# Patient Record
Sex: MALE | Race: WHITE | NOT HISPANIC OR LATINO | Employment: OTHER | URBAN - METROPOLITAN AREA
[De-identification: names, ages, dates, MRNs, and addresses within clinical notes are randomized per-mention and may not be internally consistent; named-entity substitution may affect disease eponyms.]

---

## 2017-08-08 ENCOUNTER — OFFICE VISIT (OUTPATIENT)
Dept: GASTROENTEROLOGY | Facility: CLINIC | Age: 67
End: 2017-08-08
Payer: COMMERCIAL

## 2017-08-08 VITALS
SYSTOLIC BLOOD PRESSURE: 134 MMHG | BODY MASS INDEX: 33.51 KG/M2 | HEIGHT: 72 IN | WEIGHT: 247.38 LBS | DIASTOLIC BLOOD PRESSURE: 86 MMHG | HEART RATE: 65 BPM

## 2017-08-08 DIAGNOSIS — K21.9 GASTROESOPHAGEAL REFLUX DISEASE, ESOPHAGITIS PRESENCE NOT SPECIFIED: Primary | ICD-10-CM

## 2017-08-08 DIAGNOSIS — Z79.01 CHRONIC ANTICOAGULATION: ICD-10-CM

## 2017-08-08 DIAGNOSIS — Z12.11 COLON CANCER SCREENING: ICD-10-CM

## 2017-08-08 PROCEDURE — 1159F MED LIST DOCD IN RCRD: CPT | Mod: S$GLB,,, | Performed by: INTERNAL MEDICINE

## 2017-08-08 PROCEDURE — 99999 PR PBB SHADOW E&M-EST. PATIENT-LVL III: CPT | Mod: PBBFAC,,, | Performed by: INTERNAL MEDICINE

## 2017-08-08 PROCEDURE — 99204 OFFICE O/P NEW MOD 45 MIN: CPT | Mod: S$GLB,,, | Performed by: INTERNAL MEDICINE

## 2017-08-08 PROCEDURE — 1126F AMNT PAIN NOTED NONE PRSNT: CPT | Mod: S$GLB,,, | Performed by: INTERNAL MEDICINE

## 2017-08-08 PROCEDURE — 3008F BODY MASS INDEX DOCD: CPT | Mod: S$GLB,,, | Performed by: INTERNAL MEDICINE

## 2017-08-08 RX ORDER — RABEPRAZOLE SODIUM 20 MG/1
20 TABLET, DELAYED RELEASE ORAL DAILY
COMMUNITY
End: 2019-06-12 | Stop reason: CLARIF

## 2017-08-08 RX ORDER — METOPROLOL SUCCINATE 100 MG/1
50 TABLET, EXTENDED RELEASE ORAL DAILY
COMMUNITY
End: 2019-06-12 | Stop reason: CLARIF

## 2017-08-08 RX ORDER — METFORMIN HYDROCHLORIDE 750 MG/1
750 TABLET, EXTENDED RELEASE ORAL
COMMUNITY

## 2017-08-08 RX ORDER — GLIMEPIRIDE 4 MG/1
4 TABLET ORAL
COMMUNITY

## 2017-08-08 RX ORDER — TADALAFIL 20 MG/1
20 TABLET ORAL DAILY
COMMUNITY
End: 2019-06-12 | Stop reason: CLARIF

## 2017-08-08 RX ORDER — SIMVASTATIN 20 MG/1
20 TABLET, FILM COATED ORAL NIGHTLY
COMMUNITY
End: 2019-06-12 | Stop reason: CLARIF

## 2017-08-08 NOTE — PROGRESS NOTES
Subjective:      Patient ID: Ramirez Aguilar is a 67 y.o. male.    Chief Complaint: Constipation; Heartburn; and Colonoscopy    HPI:   Patient is 67-year-old male presenting for GI evaluation.  Has chronic heartburn generally controlled on AcipHex.  Indicates he had a negative gastroscopy 10 years ago.  Also had a negative colonoscopy about 10 years ago.  Earlier this year was diagnosed with atrial fibrillation and placed on Eliquis.  Followed by an [facility] cardiologist.  Patient recognizes that he is due for follow-up colonoscopy screening.  Past medical history includes diabetes on oral agents.  Hypertension.  Nonsmoker.  Alcohol rare.  Family history negative for GI neoplasm.                                       Review of patient's allergies indicates:  No Known Allergies  History reviewed. No pertinent past medical history.  Past Surgical History:   Procedure Laterality Date    COLONOSCOPY  2007    UPPER GASTROINTESTINAL ENDOSCOPY  2007     Family History   Problem Relation Age of Onset    Heart disease Mother      Social History     Social History    Marital status:      Spouse name: N/A    Number of children: N/A    Years of education: N/A     Occupational History    Not on file.     Social History Main Topics    Smoking status: Never Smoker    Smokeless tobacco: Never Used    Alcohol use Yes    Drug use: Unknown    Sexual activity: Not on file     Other Topics Concern    Not on file     Social History Narrative    No narrative on file       Review of Systems:  Constitutional: Negative for appetite change.   HENT: Negative for trouble swallowing.   Eyes: Negative for photophobia.   Respiratory: Negative for cough and shortness of breath.   Cardiovascular: Negative for palpitations.   Gastrointestinal: See HPI for details.  Genitoupositive for frequency.  Nohematuria.   Skin: Negative for rash.   Neurological: Negative for weakness and hea  Musculoskeletal joint pains.matological:  Negative.   Psychiatric/Behavioral: Negative for suicidal ideas and behavioral problems.     Objective:     /86 (BP Location: Right arm, Patient Position: Sitting)   Pulse 65   Ht 6' (1.829 m)   Wt 112.2 kg (247 lb 6.4 oz)   BMI 33.55 kg/m²     Physical Exam:  Eyes: Pupils are equal, round, and reactive to light.   Neck: Supple. No mass  Cardiovascular: Regular rhythm . No murmur   Pulmonary/Chest: Lungs clear   Abdominal: Soft. No mass palpated. Nontender, no guarding. Positive bowel sounds   Musculoskeletal: No deformity. No edema.   Psychiatric: Alert and oriented    Assessment:     1. Gastroesophageal reflux disease, esophagitis presence not specified    2. Colon cancer screening      Plan:     Ramirez was seen today for constipation, heartburn and colonoscopy.    Diagnoses and all orders for this visit:    Gastroesophageal reflux disease, esophagitis presence not specified    Colon cancer screening    Chronic anticoagulation      Plan   Antireflux measures  Colonoscopy  Obtain OK from his cardiologist to hold Eliquis for 3 days prior    Primary physician Dr. Fritz Levy

## 2017-08-09 ENCOUNTER — TELEPHONE (OUTPATIENT)
Dept: GASTROENTEROLOGY | Facility: CLINIC | Age: 67
End: 2017-08-09

## 2017-08-09 NOTE — TELEPHONE ENCOUNTER
----- Message from Belinda Negron sent at 8/9/2017  8:13 AM CDT -----  Contact: Self [phone]  Patient is calling to talk to nurse has personal questions.

## 2017-08-09 NOTE — TELEPHONE ENCOUNTER
Spoke with patient in regards to his Cardiologist that handles his Eliquis, Dr. Rajesh Conklin at  [phone]. I informed the patient once we get the okay to hold his Eliquis the office will call him back to set a day for procedure.

## 2017-08-18 ENCOUNTER — TELEPHONE (OUTPATIENT)
Dept: GASTROENTEROLOGY | Facility: CLINIC | Age: 67
End: 2017-08-18

## 2017-08-18 NOTE — TELEPHONE ENCOUNTER
Spoke with patient in regards to his clearance from his doctor, I informed the patient that I have called and sent a fax to his doctor multiple times.

## 2017-08-18 NOTE — TELEPHONE ENCOUNTER
----- Message from Emir Mckeon sent at 8/18/2017  2:58 PM CDT -----  Contact: self/[phone]  Patient called to find out the status of the surgery clearance from his cardiologist and when the colonoscopy.    Please advise.

## 2017-08-21 ENCOUNTER — TELEPHONE (OUTPATIENT)
Dept: GASTROENTEROLOGY | Facility: CLINIC | Age: 67
End: 2017-08-21

## 2017-08-21 NOTE — TELEPHONE ENCOUNTER
Called the patient's Cardiologist office in regards to a fax  I have been sending to the office for hold on patient's Eliquis.

## 2017-09-01 ENCOUNTER — TELEPHONE (OUTPATIENT)
Dept: GASTROENTEROLOGY | Facility: CLINIC | Age: 67
End: 2017-09-01

## 2017-09-01 DIAGNOSIS — Z12.11 SCREENING FOR COLON CANCER: Primary | ICD-10-CM

## 2017-09-01 DIAGNOSIS — K21.9 GASTROESOPHAGEAL REFLUX DISEASE, ESOPHAGITIS PRESENCE NOT SPECIFIED: ICD-10-CM

## 2017-09-01 NOTE — TELEPHONE ENCOUNTER
Patient is scheduled for Colonoscopy in [place] on 09/18/2017. Patient was seen in [facility] on 08/08/2017, prep information was given and explained at the OV. Received the okay to hold the patient's Eliquis for 3 days prior to the procedure. Patient was notified of the hold on the medication. Scanned under media.

## 2017-09-11 ENCOUNTER — TELEPHONE (OUTPATIENT)
Dept: GASTROENTEROLOGY | Facility: CLINIC | Age: 67
End: 2017-09-11

## 2017-09-11 NOTE — TELEPHONE ENCOUNTER
----- Message from Edison Peralta MA sent at 9/11/2017  8:48 AM CDT -----  Contact: [phone]      ----- Message -----  From: Kathy Parkinson  Sent: 9/11/2017   8:25 AM  To: Tasha Cullen Staff ([place])    Patient called in requesting to speak with you. Patient prefers to speak with a nurse. Please advise.

## 2017-09-18 ENCOUNTER — ANESTHESIA EVENT (OUTPATIENT)
Dept: ENDOSCOPY | Facility: HOSPITAL | Age: 67
End: 2017-09-18
Payer: COMMERCIAL

## 2017-09-18 ENCOUNTER — SURGERY (OUTPATIENT)
Age: 67
End: 2017-09-18

## 2017-09-18 ENCOUNTER — ANESTHESIA (OUTPATIENT)
Dept: ENDOSCOPY | Facility: HOSPITAL | Age: 67
End: 2017-09-18
Payer: COMMERCIAL

## 2017-09-18 ENCOUNTER — HOSPITAL ENCOUNTER (OUTPATIENT)
Facility: HOSPITAL | Age: 67
Discharge: HOME | End: 2017-09-18
Attending: INTERNAL MEDICINE | Admitting: INTERNAL MEDICINE
Payer: COMMERCIAL

## 2017-09-18 DIAGNOSIS — Z12.11 COLON CANCER SCREENING: Primary | ICD-10-CM

## 2017-09-18 DIAGNOSIS — Z12.12 SCREENING FOR COLORECTAL CANCER: ICD-10-CM

## 2017-09-18 DIAGNOSIS — Z12.11 SCREENING FOR COLORECTAL CANCER: ICD-10-CM

## 2017-09-18 LAB — POCT GLUCOSE: 148 MG/DL (ref 70–110)

## 2017-09-18 PROCEDURE — G0121 COLON CA SCRN NOT HI RSK IND: HCPCS | Mod: ,,, | Performed by: INTERNAL MEDICINE

## 2017-09-18 PROCEDURE — 25000003 PHARM REV CODE 250: Performed by: ANESTHESIOLOGY

## 2017-09-18 PROCEDURE — 25000003 PHARM REV CODE 250: Performed by: INTERNAL MEDICINE

## 2017-09-18 PROCEDURE — G0121 COLON CA SCRN NOT HI RSK IND: HCPCS | Performed by: INTERNAL MEDICINE

## 2017-09-18 PROCEDURE — 37000008 HC ANESTHESIA 1ST 15 MINUTES: Performed by: INTERNAL MEDICINE

## 2017-09-18 PROCEDURE — 37000009 HC ANESTHESIA EA ADD 15 MINS: Performed by: INTERNAL MEDICINE

## 2017-09-18 PROCEDURE — 63600175 PHARM REV CODE 636 W HCPCS: Performed by: NURSE ANESTHETIST, CERTIFIED REGISTERED

## 2017-09-18 RX ORDER — PROPOFOL 10 MG/ML
VIAL (ML) INTRAVENOUS CONTINUOUS PRN
Status: DISCONTINUED | OUTPATIENT
Start: 2017-09-18 | End: 2017-09-18

## 2017-09-18 RX ORDER — LIDOCAINE HCL/PF 100 MG/5ML
SYRINGE (ML) INTRAVENOUS
Status: DISCONTINUED | OUTPATIENT
Start: 2017-09-18 | End: 2017-09-18

## 2017-09-18 RX ORDER — SODIUM CHLORIDE 9 MG/ML
INJECTION, SOLUTION INTRAVENOUS CONTINUOUS
Status: DISCONTINUED | OUTPATIENT
Start: 2017-09-18 | End: 2017-09-18 | Stop reason: HOSPADM

## 2017-09-18 RX ORDER — METOPROLOL TARTRATE 1 MG/ML
5 INJECTION, SOLUTION INTRAVENOUS ONCE
Status: COMPLETED | OUTPATIENT
Start: 2017-09-18 | End: 2017-09-18

## 2017-09-18 RX ORDER — PROPOFOL 10 MG/ML
VIAL (ML) INTRAVENOUS
Status: DISCONTINUED | OUTPATIENT
Start: 2017-09-18 | End: 2017-09-18

## 2017-09-18 RX ADMIN — PROPOFOL 40 MG: 10 INJECTION, EMULSION INTRAVENOUS at 01:09

## 2017-09-18 RX ADMIN — LIDOCAINE HYDROCHLORIDE 50 MG: 20 INJECTION, SOLUTION INTRAVENOUS at 01:09

## 2017-09-18 RX ADMIN — SODIUM CHLORIDE: 0.9 INJECTION, SOLUTION INTRAVENOUS at 12:09

## 2017-09-18 RX ADMIN — METOPROLOL TARTRATE 5 MG: 5 INJECTION INTRAVENOUS at 01:09

## 2017-09-18 RX ADMIN — PROPOFOL 150 MCG/KG/MIN: 10 INJECTION, EMULSION INTRAVENOUS at 01:09

## 2017-09-18 NOTE — DISCHARGE INSTRUCTIONS
Discharge Summary/Instructions for after Colonoscopy with Biopsy/Polypectomy    Ramirez LOPEZ Lapamao  9/18/2017  Subhash Cordova Jr., MD    Restrictions on Activity:    -  Do not drive car, or operate machinery, make critical decisions, or do activities that   require coordination or balance for 24 hours.  - The following day: return to full activity including work.  - For 3 days: No heavy lifting, straining or running.  - Diet: Eat and drink normally unless instructed otherwise.    Treatment for Common Side Effects:  - Mild abdominal pain and bloating or excessive gas: rest, eat lightly and use a heating pad.     Symptoms to watch for and report to your physician:  1. Severe abdominal pain.  2. Fever within 24 hours after a procedure.  3. A large amount of rectal bleeding. (A small amount of blood from the rectum is not serious, especially if hemorrhoids are present.)  4. Because air was put into your colon during the procedure, expelling large amount of air from your rectum is normal.  5. You may not have a bowel movement for 1-3 days because of the colonoscopy prep. This is normal.  6. Go directly to the emergency room if you notice any of the following:     Chills and/or fever over 101   Persistent vomiting   Severe abdominal pain, other than gas cramps   Severe chest pain   Black, tarry stools   Any bleeding - exceeding one tablespoon    If you have any questions or problems, please call your Physician:    Subhash Cordova Jr., MD      Lab Results: Contact Physician's Office      If a complication or emergency situation arises and you are unable to reach your Physician - GO TO THE EMERGENCY ROOM.

## 2017-09-18 NOTE — ANESTHESIA PREPROCEDURE EVALUATION
09/18/2017  Ramirez Aguilar is a 67 y.o., male.    Anesthesia Evaluation    I have reviewed the Patient Summary Reports.    I have reviewed the Nursing Notes.      Review of Systems  Anesthesia Hx:  No problems with previous Anesthesia Denies Hx of Anesthetic complications    Cardiovascular:   Exercise tolerance: good Denies Pacemaker. Hypertension, well controlled Dysrhythmias atrial fibrillation    Pulmonary:  Pulmonary Normal    Renal/:  Renal/ Normal     Hepatic/GI:   GERD    Neurological:  Neurology Normal    Endocrine:   Diabetes        Physical Exam  General:  Obesity    Airway/Jaw/Neck:  Airway Findings: Mouth Opening: Normal Tongue: Normal  General Airway Assessment: Adult, Possible difficult mask airway  Mallampati: III  Improves to II with phonation.  TM Distance: Normal, at least 6 cm      Dental:  Dental Findings: Edentulous   Chest/Lungs:  Chest/Lungs Clear    Heart/Vascular:  Heart Findings: Rhythm: Irregularly Irregular        Mental Status:  Mental Status Findings:  Alert and Oriented, Cooperative         Anesthesia Plan  Type of Anesthesia, risks & benefits discussed:  Anesthesia Type:  general, MAC  Patient's Preference:   Intra-op Monitoring Plan: standard ASA monitors  Intra-op Monitoring Plan Comments:   Post Op Pain Control Plan: multimodal analgesia  Post Op Pain Control Plan Comments:   Induction:   IV  Beta Blocker:  Patient is on a Beta-Blocker and has received one dose within the past 24 hours (No further documentation required).       Informed Consent: Patient understands risks and agrees with Anesthesia plan.  Questions answered. Anesthesia consent signed with patient.  ASA Score: 3     Day of Surgery Review of History & Physical:        Anesthesia Plan Notes: Pt with high BP and HR over 110 on first vitals.  5 mg metoprolol ordered and given preop        Ready For Surgery From  Anesthesia Perspective.

## 2017-09-18 NOTE — TRANSFER OF CARE
Anesthesia Transfer of Care Note    Patient: Ramirez Aguilar    Procedure(s) Performed: Procedure(s) (LRB):  COLONOSCOPY/ Split dose (N/A)    Patient location: GI    Anesthesia Type: MAC    Transport from OR: Transported from OR on room air with adequate spontaneous ventilation    Post pain: adequate analgesia    Post assessment: no apparent anesthetic complications    Post vital signs: stable    Level of consciousness: awake, alert and oriented    Nausea/Vomiting: no nausea/vomiting    Complications: none    Transfer of care protocol was followed      Last vitals:   Visit Vitals  BP (!) 186/110 (BP Location: Right arm)   Pulse 104   Resp 16   Ht 6' (1.829 m)   Wt 109.8 kg (242 lb)   SpO2 99%   BMI 32.82 kg/m²

## 2017-09-18 NOTE — ANESTHESIA POSTPROCEDURE EVALUATION
Anesthesia Post Evaluation    Patient: Ramirez LOPEZ Lapamao    Procedure(s) Performed: Procedure(s) (LRB):  COLONOSCOPY/ Split dose (N/A)    Final Anesthesia Type: MAC  Patient location during evaluation: GI PACU  Patient participation: Yes- Able to Participate  Level of consciousness: awake and alert and oriented  Post-procedure vital signs: reviewed and stable  Pain management: adequate  Airway patency: patent  PONV status at discharge: No PONV  Anesthetic complications: no      Cardiovascular status: blood pressure returned to baseline and hemodynamically stable  Respiratory status: unassisted, spontaneous ventilation and room air  Hydration status: euvolemic  Follow-up not needed.        Visit Vitals  BP (!) 186/110 (BP Location: Right arm)   Pulse 104   Resp 16   Ht 6' (1.829 m)   Wt 109.8 kg (242 lb)   SpO2 99%   BMI 32.82 kg/m²       Pain/Karolina Score: Pain Assessment Performed: Yes (9/18/2017 12:51 PM)  Presence of Pain: denies (9/18/2017 12:51 PM)

## 2017-09-18 NOTE — H&P
Patient is 67-year-old male presenting for GI evaluation.  Has chronic heartburn generally controlled on AcipHex.  Indicates he had a negative gastroscopy 10 years ago.  Also had a negative colonoscopy about 10 years ago.  Earlier this year was diagnosed with atrial fibrillation and placed on Eliquis.  Followed by an [facility] cardiologist.  Patient recognizes that he is due for follow-up colonoscopy screening.  Past medical history includes diabetes on oral agents.  Hypertension.  Nonsmoker.  Alcohol rare.  Family history negative for GI neoplasm.                                       Review of patient's allergies indicates:  No Known Allergies  History reviewed. No pertinent past medical history.  Past Surgical History:   Procedure Laterality Date    COLONOSCOPY   2007    UPPER GASTROINTESTINAL ENDOSCOPY   2007            Family History   Problem Relation Age of Onset    Heart disease Mother        Social History   Social History            Social History    Marital status:        Spouse name: N/A    Number of children: N/A    Years of education: N/A          Occupational History    Not on file.           Social History Main Topics    Smoking status: Never Smoker    Smokeless tobacco: Never Used    Alcohol use Yes    Drug use: Unknown    Sexual activity: Not on file           Other Topics Concern    Not on file          Social History Narrative    No narrative on file            Review of Systems:  Constitutional: Negative for appetite change.   HENT: Negative for trouble swallowing.   Eyes: Negative for photophobia.   Respiratory: Negative for cough and shortness of breath.   Cardiovascular: Negative for palpitations.   Gastrointestinal: See HPI for details.  Genitoupositive for frequency.  Nohematuria.   Skin: Negative for rash.   Neurological: Negative for weakness and hea  Musculoskeletal joint pains.matological: Negative.   Psychiatric/Behavioral: Negative for suicidal ideas and  behavioral problems.      Objective:         Physical Exam:  Eyes: Pupils are equal, round, and reactive to light.   Neck: Supple. No mass  Cardiovascular: Regular rhythm . No murmur   Pulmonary/Chest: Lungs clear   Abdominal: Soft. No mass palpated. Nontender, no guarding. Positive bowel sounds   Musculoskeletal: No deformity. No edema.   Psychiatric: Alert and oriented     Assessment:      1. Gastroesophageal reflux disease, esophagitis presence not specified    2. Colon cancer screening       Plan:      Ramirez was seen today for constipation, heartburn and colonoscopy.     Diagnoses and all orders for this visit:     Gastroesophageal reflux disease, esophagitis presence not specified     Colon cancer screening     Chronic anticoagulation        Plan   Antireflux measures  Colonoscopy  Obtain OK from his cardiologist to hold Eliquis for 3 days prior     Primary physician Dr. Fritz Levy

## 2017-09-19 VITALS
DIASTOLIC BLOOD PRESSURE: 81 MMHG | RESPIRATION RATE: 12 BRPM | OXYGEN SATURATION: 100 % | WEIGHT: 242 LBS | SYSTOLIC BLOOD PRESSURE: 149 MMHG | HEART RATE: 72 BPM | HEIGHT: 72 IN | BODY MASS INDEX: 32.78 KG/M2

## 2017-11-20 ENCOUNTER — HOSPITAL ENCOUNTER (EMERGENCY)
Facility: HOSPITAL | Age: 67
Discharge: HOME | End: 2017-11-20
Attending: EMERGENCY MEDICINE
Payer: COMMERCIAL

## 2017-11-20 VITALS
WEIGHT: 250 LBS | RESPIRATION RATE: 20 BRPM | SYSTOLIC BLOOD PRESSURE: 153 MMHG | DIASTOLIC BLOOD PRESSURE: 89 MMHG | HEIGHT: 72 IN | HEART RATE: 80 BPM | TEMPERATURE: 98 F | BODY MASS INDEX: 33.86 KG/M2 | OXYGEN SATURATION: 97 %
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
## 2017-11-20 DIAGNOSIS — S30.0XXA CONTUSION OF SACRUM, INITIAL ENCOUNTER: ICD-10-CM

## 2017-11-20 DIAGNOSIS — S80.01XA CONTUSION OF RIGHT KNEE, INITIAL ENCOUNTER: Primary | ICD-10-CM

## 2017-11-20 PROCEDURE — 99283 EMERGENCY DEPT VISIT LOW MDM: CPT

## 2017-11-20 RX ORDER — TRAMADOL HYDROCHLORIDE 50 MG/1
50 TABLET ORAL EVERY 6 HOURS PRN
Qty: 12 TABLET | Refills: 0 | Status: SHIPPED | OUTPATIENT
Start: 2017-11-20 | End: 2017-11-30

## 2017-11-20 RX ORDER — IRBESARTAN 150 MG/1
150 TABLET ORAL NIGHTLY
COMMUNITY

## 2017-11-20 NOTE — ED PROVIDER NOTES
Encounter Date: 11/20/2017       History     Chief Complaint   Patient presents with    Fall     pt slipped and fell last night. c/o right knee pain and pain to left side of butt. using cane today only since fall. no loc or head trauma. took ibuprofen pta     The history is provided by the patient.   Fall   The accident occurred yesterday. The fall occurred while walking. He fell from a height of 1 to 2 ft. He landed on a hard floor. The point of impact was the buttocks and right knee. The pain is present in the right knee. The pain is at a severity of 5/10. He was ambulatory at the scene. There was no entrapment after the fall. There was no drug use involved in the accident. There was no alcohol use involved in the accident. Pertinent negatives include no neck pain, no back pain, no paresthesias, no paralysis, no visual change, no fever, no numbness, no abdominal pain, no bowel incontinence, no nausea, no vomiting and no loss of consciousness.     Review of patient's allergies indicates:  No Known Allergies  Past Medical History:   Diagnosis Date    A-fib     Diabetes mellitus     Hypertension      Past Surgical History:   Procedure Laterality Date    COLONOSCOPY  2007    COLONOSCOPY N/A 9/18/2017    Procedure: COLONOSCOPY/ Split dose;  Surgeon: Subhash Cordova Jr., MD;  Location[facility];  Service: Endoscopy;  Laterality: N/A;    KNEE ARTHROSCOPY Right     UPPER GASTROINTESTINAL ENDOSCOPY  2007     Family History   Problem Relation Age of Onset    Heart disease Mother      Social History   Substance Use Topics    Smoking status: Never Smoker    Smokeless tobacco: Never Used    Alcohol use Yes      Comment: occasionall     Review of Systems   Constitutional: Negative for fever.   Gastrointestinal: Negative for abdominal pain, bowel incontinence, nausea and vomiting.   Musculoskeletal: Positive for arthralgias. Negative for back pain and neck pain.   Neurological: Negative for loss of consciousness,  numbness and paresthesias.   All other systems reviewed and are negative.      Physical Exam     Initial Vitals [11/20/17 1109]   BP Pulse Resp Temp SpO2   (!) 161/95 82 20 97.9 °F (36.6 °C) 96 %      MAP       117         Physical Exam    Nursing note and vitals reviewed.  Constitutional: He appears well-developed and well-nourished.   HENT:   Head: Atraumatic.   Eyes: Conjunctivae and EOM are normal.   Neck: Normal range of motion. Neck supple.   Cardiovascular: Normal rate, regular rhythm, normal heart sounds and intact distal pulses.   Pulmonary/Chest: Breath sounds normal.   Abdominal: Soft.   Musculoskeletal:        Left hip: He exhibits normal range of motion, normal strength, no tenderness, no bony tenderness, no swelling, no crepitus, no deformity and no laceration.        Right knee: He exhibits decreased range of motion. He exhibits no swelling, no effusion, no ecchymosis, no deformity, no laceration and no erythema.   Neurological: He is alert and oriented to person, place, and time.   Skin: Skin is warm and dry. Capillary refill takes less than 2 seconds.   Psychiatric: He has a normal mood and affect. His behavior is normal. Judgment and thought content normal.         ED Course   Procedures  Labs Reviewed - No data to display       X-Rays:   Independently Interpreted Readings:   Other Readings:  No fracture or dislocation    Medical Decision Making:   Clinical Tests:   Radiological Study: Ordered and Reviewed                   ED Course      Clinical Impression:   The primary encounter diagnosis was Contusion of right knee, initial encounter. Diagnoses of Fall and Contusion of sacrum, initial encounter were also pertinent to this visit.    Disposition:   Disposition: Discharged  Condition: Stable                        Isela Mcneill MD  11/20/17 8323

## 2018-07-18 DIAGNOSIS — R06.09 DYSPNEA ON EXERTION: Primary | ICD-10-CM

## 2018-07-20 ENCOUNTER — HOSPITAL ENCOUNTER (OUTPATIENT)
Dept: CARDIOLOGY | Facility: HOSPITAL | Age: 68
Discharge: HOME | End: 2018-07-20
Attending: FAMILY MEDICINE
Payer: MEDICARE

## 2018-07-20 DIAGNOSIS — R06.09 DYSPNEA ON EXERTION: ICD-10-CM

## 2018-07-20 PROCEDURE — 93306 TTE W/DOPPLER COMPLETE: CPT | Mod: 26,,, | Performed by: INTERNAL MEDICINE

## 2018-07-20 PROCEDURE — 93306 TTE W/DOPPLER COMPLETE: CPT | Mod: PO

## 2018-07-23 LAB
ESTIMATED PA SYSTOLIC PRESSURE: 51.24
MITRAL VALVE MOBILITY: NORMAL
RETIRED EF AND QEF - SEE NOTES: 55 (ref 55–65)
TRICUSPID VALVE REGURGITATION: ABNORMAL

## 2018-07-25 DIAGNOSIS — R06.09 EXERTIONAL DYSPNEA: Primary | ICD-10-CM

## 2018-07-25 DIAGNOSIS — I27.0 PRIMARY PULMONARY HYPERTENSION: ICD-10-CM

## 2018-08-01 ENCOUNTER — HOSPITAL ENCOUNTER (OUTPATIENT)
Dept: RADIOLOGY | Facility: HOSPITAL | Age: 68
Discharge: HOME | End: 2018-08-01
Attending: FAMILY MEDICINE
Payer: MEDICARE

## 2018-08-01 DIAGNOSIS — R06.09 EXERTIONAL DYSPNEA: ICD-10-CM

## 2018-08-01 DIAGNOSIS — I27.0 PRIMARY PULMONARY HYPERTENSION: ICD-10-CM

## 2018-08-01 PROCEDURE — 25500020 PHARM REV CODE 255: Mod: PO

## 2018-08-01 PROCEDURE — 71275 CT ANGIOGRAPHY CHEST: CPT | Mod: TC,PO

## 2018-08-01 RX ADMIN — IOHEXOL 100 ML: 350 INJECTION, SOLUTION INTRAVENOUS at 01:08

## 2019-06-03 ENCOUNTER — HOSPITAL ENCOUNTER (OUTPATIENT)
Dept: CARDIOLOGY | Facility: HOSPITAL | Age: 69
Discharge: HOME | End: 2019-06-03
Payer: MEDICARE

## 2019-06-03 DIAGNOSIS — I10 ESSENTIAL HYPERTENSION, MALIGNANT: ICD-10-CM

## 2019-06-03 DIAGNOSIS — I10 ESSENTIAL HYPERTENSION, MALIGNANT: Primary | ICD-10-CM

## 2019-06-03 PROCEDURE — 93010 EKG 12-LEAD: ICD-10-PCS | Mod: ,,, | Performed by: STUDENT IN AN ORGANIZED HEALTH CARE EDUCATION/TRAINING PROGRAM

## 2019-06-03 PROCEDURE — 93010 ELECTROCARDIOGRAM REPORT: CPT | Mod: ,,, | Performed by: STUDENT IN AN ORGANIZED HEALTH CARE EDUCATION/TRAINING PROGRAM

## 2019-06-03 PROCEDURE — 93005 ELECTROCARDIOGRAM TRACING: CPT | Mod: PO

## 2019-06-11 ENCOUNTER — ANESTHESIA EVENT (OUTPATIENT)
Dept: SURGERY | Facility: HOSPITAL | Age: 69
End: 2019-06-11
Payer: MEDICARE

## 2019-06-11 NOTE — ANESTHESIA PREPROCEDURE EVALUATION
06/11/2019     Ramirez Aguilar is a 69 y.o., male is scheduled for right TKA under [facility] on 6/18/2019.      Cardiology clearance 6/04/2019 in chart.  General Medicine clearance pending via fax on 6/17/2019    Anesthesia Evaluation    I have reviewed the Patient Summary Reports.    I have reviewed the Nursing Notes.   I have reviewed the Medications.   Steroids Taken In Past Year: Cortisone    Review of Systems  Anesthesia Hx:  No problems with previous Anesthesia  History of prior surgery of interest to airway management or planning: Previous anesthesia: General, Spinal Denies Family Hx of Anesthesia complications.   Denies Personal Hx of Anesthesia complications.   Social:  Alcohol Use, Non-Smoker    Hematology/Oncology:        Hematology Comments: On apixaban for a-fb will hold starting 6/18/2019   Cardiovascular:   Exercise tolerance: good Hypertension, well controlled CAD asymptomatic Dysrhythmias (on apixaban and will hold 48 hours prior to procedure) atrial fibrillation     hyperlipidemia ECG has been reviewed. 2D echo 2017:   (EF 55-60%; Pulmonary hypertension PA pressure 51 mmHg Hypertension    Pulmonary:   Denies Asthma.  Denies Shortness of breath. Sleep Apnea, CPAP    Renal/:  Renal/ Normal     Hepatic/GI:   Denies GERD.    Musculoskeletal:   Arthritis   Musculoskeletal General/Symptoms: (right knee) joint pain, joint swelling.    Endocrine:   Diabetes, type 2  Diabetes , controlled by insulin, oral hypoglycemics. , most recent HgA1c value was doesn't recall date, but states thinks it was 7.5        Physical Exam  General:  Obesity    Airway/Jaw/Neck:  Airway Findings: Mouth Opening: Small, but > 3cm Tongue: Normal  Mallampati: III  TM Distance: 4 - 6 cm  Jaw/Neck Findings:  Neck Findings:  Girth Increased     Eyes/Ears/Nose:  EYES/EARS/NOSE FINDINGS: Normal   Dental:  Dental Findings: Upper  Dentures, Lower Dentures   Chest/Lungs:  Chest/Lungs Clear    Heart/Vascular:  Heart Findings: Rate: Normal  Rhythm: Irregularly Irregular     Abdomen:  Abdomen Findings: Normal    Musculoskeletal:  Musculoskeletal Findings: Right knee pain intermittent          Anesthesia Plan  Type of Anesthesia, risks & benefits discussed:  Anesthesia Type:  general  Patient's Preference: general  Intra-op Monitoring Plan:   Intra-op Monitoring Plan Comments:   Post Op Pain Control Plan:   Post Op Pain Control Plan Comments:   Induction:   IV  Beta Blocker:  Patient is not currently on a Beta-Blocker (No further documentation required).       Informed Consent: Patient understands risks and agrees with Anesthesia plan.  Questions answered. Anesthesia consent signed with patient.  ASA Score: 2     Day of Surgery Review of History & Physical:  There are no significant changes.      Anesthesia Plan Notes: Cardiology clearance 6/04/2019 in chart.  General Medicine clearance pending via fax on  6/17/2019.        Ready For Surgery From Anesthesia Perspective.

## 2019-06-12 ENCOUNTER — HOSPITAL ENCOUNTER (OUTPATIENT)
Dept: PREADMISSION TESTING | Facility: HOSPITAL | Age: 69
Discharge: HOME | End: 2019-06-12
Payer: MEDICARE

## 2019-06-12 VITALS
HEART RATE: 83 BPM | HEIGHT: 72 IN | BODY MASS INDEX: 33.86 KG/M2 | DIASTOLIC BLOOD PRESSURE: 88 MMHG | WEIGHT: 250 LBS | RESPIRATION RATE: 18 BRPM | TEMPERATURE: 98 F | SYSTOLIC BLOOD PRESSURE: 145 MMHG | OXYGEN SATURATION: 98 %

## 2019-06-12 RX ORDER — DILTIAZEM HYDROCHLORIDE 240 MG/1
240 CAPSULE, COATED, EXTENDED RELEASE ORAL DAILY
COMMUNITY

## 2019-06-12 RX ORDER — OMEPRAZOLE 20 MG/1
20 CAPSULE, DELAYED RELEASE ORAL DAILY
COMMUNITY

## 2019-06-12 RX ORDER — SODIUM CHLORIDE 9 MG/ML
INJECTION, SOLUTION INTRAVENOUS CONTINUOUS
Status: DISCONTINUED | OUTPATIENT
Start: 2019-06-12 | End: 2019-06-13 | Stop reason: HOSPADM

## 2019-06-12 RX ORDER — LIDOCAINE HYDROCHLORIDE 10 MG/ML
1 INJECTION, SOLUTION EPIDURAL; INFILTRATION; INTRACAUDAL; PERINEURAL ONCE
Status: DISCONTINUED | OUTPATIENT
Start: 2019-06-12 | End: 2019-06-13 | Stop reason: HOSPADM

## 2019-06-12 RX ORDER — HYDROCHLOROTHIAZIDE 25 MG/1
25 TABLET ORAL DAILY
COMMUNITY

## 2019-06-12 RX ORDER — INSULIN GLARGINE 100 [IU]/ML
18 INJECTION, SOLUTION SUBCUTANEOUS NIGHTLY
COMMUNITY

## 2019-06-12 RX ORDER — ATORVASTATIN CALCIUM 20 MG/1
20 TABLET, FILM COATED ORAL DAILY
COMMUNITY

## 2019-06-12 RX ORDER — SILDENAFIL 100 MG/1
100 TABLET, FILM COATED ORAL DAILY PRN
COMMUNITY

## 2019-06-12 NOTE — DISCHARGE INSTRUCTIONS
Your surgery is scheduled for 6/18/19.    Please report to Outpatient Surgery Intake Office on the 2nd FLOOR at 5:30 a.m.          INSTRUCTIONS IMPORTANT!!!  ¨ Do not eat or drink after 12 midnight-including water. OK to brush teeth, no   gum, candy or mints!    ¨ Take only these medicines with a small swallow of water-morning of surgery: Diltiazem, omeprazole, and irbesartan        ____  No powder, lotions or creams to surgical area.  ____  Please remove all jewelry, including piercings and leave at home.  ____  No money or valuables needed. Please leave at home.  ____  Please bring any documents given by your doctor.  ____  If going home the same day, arrange for a ride home. You will not be able to             drive if Anesthesia was used.  ____  Wear loose fitting clothing. Allow for dressings, bandages.  ____  Stop Aspirin, Ibuprofen, Motrin and Aleve at least 3-5 days before surgery, unless otherwise instructed by your doctor, or the nurse.   You MAY use Tylenol/acetaminophen until day of surgery.  ____  Wash the surgical area with Hibiclens the night before surgery, and again the             morning of surgery.  Be sure to rinse hibiclens off completely (if instructed by   nurse).  ____  If you take diabetic medication, do not take am of surgery unless instructed by Doctor.  ____  Call MD for temperature above 101 degrees or any other signs of infection such as Urinary (bladder) infection, Upper respiratory infection, skin boils, etc.  ____ Stop taking any Fish Oil supplement or any Vitamins that contain Vitamin E at least 5 days prior to surgery.  ____ Do Not wear your contact lenses the day of your procedure.  You may wear your glasses.      ____Do not shave surgical site for 3 days prior to surgery.  ____ Practice Good hand washing before, during, and after procedure.      I have read or had read and explained to me, and understand the above information.  Additional comments or instructions:  For  additional questions call [phone]      ANESTHESIA SIDE EFFECTS  -For the first 24 hours after surgery:  Do not drive, use heavy equipment, make important decisions, or drink alcohol  -It is normal to feel sleepy for several hours.  Rest until you are more awake.  -Have someone stay with you, if needed.  They can watch for problems and help keep you safe.  -Some possible post anesthesia side effects include: nausea and vomiting, sore throat and hoarseness, sleepiness, and dizziness.        Pre-Op Bathing Instructions    Before surgery, you can play an important role in your own health.    Because skin is not sterile, we need to be sure that your skin is as free of germs as possible. By following the instructions below, you can reduce the number of germs on your skin before surgery.    IMPORTANT: You will need to shower with a special soap called Hibiclens*, available at any pharmacy.  If you are allergic to Chlorhexidine (the antiseptic in Hibiclens), use an antibacterial soap such as Dial Soap for your preoperative shower.  You will shower with Hibiclens both the night before your surgery and the morning of your surgery.  Do not use Hibiclens on the head, face or genitals to avoid injury to those areas.    STEP #1: THE NIGHT BEFORE YOUR SURGERY     1. Do not shave the area of your body where your surgery will be performed.  2. Shower and wash your hair and body as usual with your normal soap and shampoo.  3. Rinse your hair and body thoroughly after you shower to remove all soap residue.  4. With your hand, apply one packet of Hibiclens soap to the surgical site.   5. Wash the site gently for five (5) minutes. Do not scrub your skin too hard.   6. Do not wash with your regular soap after Hibiclens is used.  7. Rinse your body thoroughly.  8. Pat yourself dry with a clean, soft towel.  9. Do not use lotion, cream, or powder.  10. Wear clean clothes.    STEP #2: THE MORNING OF YOUR SURGERY     1. Repeat Step #1.    *  Not to be used by people allergic to Chlorhexidine.          Total Knee Replacement  During total knee replacement surgery, your damaged knee joint is replaced with an artificial joint, called a prosthesis. This surgery almost always reduces joint pain and improves your quality of life.     The parts of the prosthesis are secured to the bones of the knee. Together they form the new joint.   Before your surgery  You will most likely arrive at the hospital on the morning of the surgery. Be sure to follow all of your doctors instructions on preparing for surgery:  · Follow any directions you are given for taking medicines or for not eating or drinking before surgery.  · At the hospital, your temperature, pulse, breathing, and blood pressure will be checked.  · An IV (intravenous) line will be started to provide fluids and medicines needed during surgery.  The surgical procedure  When the surgical team is ready, youll be taken to the operating room. There youll be given anesthesia to help you sleep through surgery, or to make you numb from the waist down. Then an incision is made on the front or side of your knee. Any damaged bone is cleaned away, and the new joint components are put into place. The incision is closed with surgical staples or stitches.  After your surgery  After surgery, youll be sent to the recovery room. When you are fully awake, youll be moved to your room. The nurses will give you medicines to ease your pain. You may have a catheter (small tube) in your bladder. A continuous passive motion machine may be used on your knee to keep it from getting stiff. A sequential compression machine may be used to prevent blood clots by gently squeezing then releasing your leg. You may be given medicine to prevent blood clots. Soon, healthcare providers will help you get up and moving.  When to call your doctor  Once at home, call your doctor if you have any of the symptoms below:  · An increase in knee  pain  · Pain or swelling in the calf or leg  · Unusual redness, heat, or drainage at the incision site  · Fever of 100.4°F (38°C) or higher  · Shaking chills  · Trouble breathing or chest pain (call 911)   Date Last Reviewed: 9/20/2015  © 3299-7855 [employer]. [address] [place] [address]. All rights reserved. This information is not intended as a substitute for professional medical care. Always follow your healthcare professional's instructions.

## 2019-06-12 NOTE — PRE-PROCEDURE INSTRUCTIONS
Edie  [phone] - wife    Allergies, medical, surgical, family and psychosocial histories reviewed with patient. Periop plan of care reviewed. Preop instructions given, including medications to take and to hold. Hibiclens soap and instructions on use given. Time allotted for questions to be addressed.  Patient verbalized understanding.

## 2019-06-18 ENCOUNTER — ANESTHESIA (OUTPATIENT)
Dept: SURGERY | Facility: HOSPITAL | Age: 69
End: 2019-06-18
Payer: MEDICARE

## 2019-06-18 ENCOUNTER — HOSPITAL ENCOUNTER (OUTPATIENT)
Facility: HOSPITAL | Age: 69
Discharge: HOME | End: 2019-06-19
Payer: MEDICARE

## 2019-06-18 DIAGNOSIS — Z79.4 TYPE 2 DIABETES MELLITUS WITHOUT COMPLICATION, WITH LONG-TERM CURRENT USE OF INSULIN: ICD-10-CM

## 2019-06-18 DIAGNOSIS — E11.9 TYPE 2 DIABETES MELLITUS WITHOUT COMPLICATION, WITH LONG-TERM CURRENT USE OF INSULIN: ICD-10-CM

## 2019-06-18 DIAGNOSIS — M17.11 PRIMARY OSTEOARTHRITIS OF RIGHT KNEE: Primary | ICD-10-CM

## 2019-06-18 LAB
POCT GLUCOSE: 139 MG/DL (ref 70–110)
POCT GLUCOSE: 240 MG/DL (ref 70–110)
POCT GLUCOSE: 250 MG/DL (ref 70–110)
POCT GLUCOSE: 262 MG/DL (ref 70–110)
POCT GLUCOSE: 274 MG/DL (ref 70–110)

## 2019-06-18 PROCEDURE — 64450 NJX AA&/STRD OTHER PN/BRANCH: CPT | Performed by: ANESTHESIOLOGY

## 2019-06-18 PROCEDURE — 37000009 HC ANESTHESIA EA ADD 15 MINS

## 2019-06-18 PROCEDURE — 94799 UNLISTED PULMONARY SVC/PX: CPT

## 2019-06-18 PROCEDURE — C1776 JOINT DEVICE (IMPLANTABLE): HCPCS

## 2019-06-18 PROCEDURE — 25000003 PHARM REV CODE 250

## 2019-06-18 PROCEDURE — C1713 ANCHOR/SCREW BN/BN,TIS/BN: HCPCS

## 2019-06-18 PROCEDURE — 37000008 HC ANESTHESIA 1ST 15 MINUTES

## 2019-06-18 PROCEDURE — 63600175 PHARM REV CODE 636 W HCPCS

## 2019-06-18 PROCEDURE — 27000221 HC OXYGEN, UP TO 24 HOURS

## 2019-06-18 PROCEDURE — 27200671 HC STIMUCATH NEEDLE/ CATHETER: Performed by: ANESTHESIOLOGY

## 2019-06-18 PROCEDURE — S5571 INSULIN DISPOS PEN 3 ML: HCPCS

## 2019-06-18 PROCEDURE — 64447 NJX AA&/STRD FEMORAL NRV IMG: CPT | Performed by: ANESTHESIOLOGY

## 2019-06-18 PROCEDURE — 25000003 PHARM REV CODE 250: Performed by: ANESTHESIOLOGY

## 2019-06-18 PROCEDURE — 97161 PT EVAL LOW COMPLEX 20 MIN: CPT

## 2019-06-18 PROCEDURE — 71000039 HC RECOVERY, EACH ADD'L HOUR

## 2019-06-18 PROCEDURE — 94761 N-INVAS EAR/PLS OXIMETRY MLT: CPT

## 2019-06-18 PROCEDURE — 27201423 OPTIME MED/SURG SUP & DEVICES STERILE SUPPLY

## 2019-06-18 PROCEDURE — 63600175 PHARM REV CODE 636 W HCPCS: Performed by: ANESTHESIOLOGY

## 2019-06-18 PROCEDURE — 97116 GAIT TRAINING THERAPY: CPT

## 2019-06-18 PROCEDURE — 71000033 HC RECOVERY, INTIAL HOUR

## 2019-06-18 PROCEDURE — 36000711

## 2019-06-18 PROCEDURE — 36000710

## 2019-06-18 PROCEDURE — 27200704 HC ULTRASOUND NDL/ECHOSTIM: Performed by: ANESTHESIOLOGY

## 2019-06-18 PROCEDURE — 97165 OT EVAL LOW COMPLEX 30 MIN: CPT

## 2019-06-18 DEVICE — CEMENT BONE IMPLANT: Type: IMPLANTABLE DEVICE | Site: KNEE | Status: FUNCTIONAL

## 2019-06-18 DEVICE — PATELLA OVAL 38MM: Type: IMPLANTABLE DEVICE | Site: KNEE | Status: FUNCTIONAL

## 2019-06-18 DEVICE — COMPONENT FEM PS CEM SZ4 R: Type: IMPLANTABLE DEVICE | Site: KNEE | Status: FUNCTIONAL

## 2019-06-18 DEVICE — INSERT TIB STBL SIGMA SZ4 8MM: Type: IMPLANTABLE DEVICE | Site: KNEE | Status: FUNCTIONAL

## 2019-06-18 DEVICE — PIN THREADED STERILE: Type: IMPLANTABLE DEVICE | Site: KNEE | Status: FUNCTIONAL

## 2019-06-18 DEVICE — KIT PIN STEINMANN PFC .025X5IN: Type: IMPLANTABLE DEVICE | Site: KNEE | Status: FUNCTIONAL

## 2019-06-18 DEVICE — TRAY TIBIAL CEMENT SZ 4 COBALT: Type: IMPLANTABLE DEVICE | Site: KNEE | Status: FUNCTIONAL

## 2019-06-18 RX ORDER — GLYCOPYRROLATE 0.2 MG/ML
INJECTION INTRAMUSCULAR; INTRAVENOUS
Status: DISCONTINUED | OUTPATIENT
Start: 2019-06-18 | End: 2019-06-18

## 2019-06-18 RX ORDER — MIDAZOLAM HYDROCHLORIDE 1 MG/ML
INJECTION, SOLUTION INTRAMUSCULAR; INTRAVENOUS
Status: DISCONTINUED | OUTPATIENT
Start: 2019-06-18 | End: 2019-06-18

## 2019-06-18 RX ORDER — HYDROMORPHONE HYDROCHLORIDE 2 MG/ML
0.5 INJECTION, SOLUTION INTRAMUSCULAR; INTRAVENOUS; SUBCUTANEOUS
Status: DISCONTINUED | OUTPATIENT
Start: 2019-06-18 | End: 2019-06-19 | Stop reason: HOSPADM

## 2019-06-18 RX ORDER — TRANEXAMIC ACID 100 MG/ML
1000 INJECTION, SOLUTION INTRAVENOUS ONCE
Status: COMPLETED | OUTPATIENT
Start: 2019-06-18 | End: 2019-06-18

## 2019-06-18 RX ORDER — HYDROMORPHONE HYDROCHLORIDE 2 MG/ML
0.5 INJECTION, SOLUTION INTRAMUSCULAR; INTRAVENOUS; SUBCUTANEOUS EVERY 5 MIN PRN
Status: DISCONTINUED | OUTPATIENT
Start: 2019-06-18 | End: 2019-06-18 | Stop reason: HOSPADM

## 2019-06-18 RX ORDER — METFORMIN HYDROCHLORIDE 750 MG/1
750 TABLET, EXTENDED RELEASE ORAL
Status: DISCONTINUED | OUTPATIENT
Start: 2019-06-19 | End: 2019-06-19 | Stop reason: HOSPADM

## 2019-06-18 RX ORDER — PHENYLEPHRINE HYDROCHLORIDE 10 MG/ML
INJECTION INTRAVENOUS
Status: DISCONTINUED | OUTPATIENT
Start: 2019-06-18 | End: 2019-06-18

## 2019-06-18 RX ORDER — IBUPROFEN 200 MG
16 TABLET ORAL
Status: DISCONTINUED | OUTPATIENT
Start: 2019-06-18 | End: 2019-06-19 | Stop reason: HOSPADM

## 2019-06-18 RX ORDER — ZOLPIDEM TARTRATE 5 MG/1
5 TABLET ORAL NIGHTLY PRN
Status: DISCONTINUED | OUTPATIENT
Start: 2019-06-18 | End: 2019-06-19 | Stop reason: HOSPADM

## 2019-06-18 RX ORDER — ONDANSETRON 2 MG/ML
INJECTION INTRAMUSCULAR; INTRAVENOUS
Status: DISCONTINUED | OUTPATIENT
Start: 2019-06-18 | End: 2019-06-18

## 2019-06-18 RX ORDER — DEXAMETHASONE SODIUM PHOSPHATE 4 MG/ML
INJECTION, SOLUTION INTRA-ARTICULAR; INTRALESIONAL; INTRAMUSCULAR; INTRAVENOUS; SOFT TISSUE
Status: DISCONTINUED | OUTPATIENT
Start: 2019-06-18 | End: 2019-06-18

## 2019-06-18 RX ORDER — HYDROMORPHONE HYDROCHLORIDE 2 MG/ML
INJECTION, SOLUTION INTRAMUSCULAR; INTRAVENOUS; SUBCUTANEOUS
Status: DISCONTINUED | OUTPATIENT
Start: 2019-06-18 | End: 2019-06-18

## 2019-06-18 RX ORDER — HYDROMORPHONE HYDROCHLORIDE 2 MG/ML
0.2 INJECTION, SOLUTION INTRAMUSCULAR; INTRAVENOUS; SUBCUTANEOUS EVERY 5 MIN PRN
Status: DISCONTINUED | OUTPATIENT
Start: 2019-06-18 | End: 2019-06-18 | Stop reason: HOSPADM

## 2019-06-18 RX ORDER — SODIUM CHLORIDE 0.9 % (FLUSH) 0.9 %
10 SYRINGE (ML) INJECTION
Status: DISCONTINUED | OUTPATIENT
Start: 2019-06-18 | End: 2019-06-19 | Stop reason: HOSPADM

## 2019-06-18 RX ORDER — LOSARTAN POTASSIUM 25 MG/1
25 TABLET ORAL DAILY
Status: DISCONTINUED | OUTPATIENT
Start: 2019-06-19 | End: 2019-06-19 | Stop reason: HOSPADM

## 2019-06-18 RX ORDER — DOCUSATE SODIUM 100 MG/1
100 CAPSULE, LIQUID FILLED ORAL EVERY 12 HOURS
Status: DISCONTINUED | OUTPATIENT
Start: 2019-06-18 | End: 2019-06-19 | Stop reason: HOSPADM

## 2019-06-18 RX ORDER — PROPOFOL 10 MG/ML
VIAL (ML) INTRAVENOUS
Status: DISCONTINUED | OUTPATIENT
Start: 2019-06-18 | End: 2019-06-18

## 2019-06-18 RX ORDER — LIDOCAINE HCL/PF 100 MG/5ML
SYRINGE (ML) INTRAVENOUS
Status: DISCONTINUED | OUTPATIENT
Start: 2019-06-18 | End: 2019-06-18

## 2019-06-18 RX ORDER — SODIUM CHLORIDE, SODIUM LACTATE, POTASSIUM CHLORIDE, CALCIUM CHLORIDE 600; 310; 30; 20 MG/100ML; MG/100ML; MG/100ML; MG/100ML
INJECTION, SOLUTION INTRAVENOUS CONTINUOUS PRN
Status: DISCONTINUED | OUTPATIENT
Start: 2019-06-18 | End: 2019-06-18

## 2019-06-18 RX ORDER — INSULIN ASPART 100 [IU]/ML
1-10 INJECTION, SOLUTION INTRAVENOUS; SUBCUTANEOUS
Status: DISCONTINUED | OUTPATIENT
Start: 2019-06-18 | End: 2019-06-19 | Stop reason: HOSPADM

## 2019-06-18 RX ORDER — DILTIAZEM HYDROCHLORIDE 120 MG/1
240 CAPSULE, COATED, EXTENDED RELEASE ORAL DAILY
Status: DISCONTINUED | OUTPATIENT
Start: 2019-06-18 | End: 2019-06-19 | Stop reason: HOSPADM

## 2019-06-18 RX ORDER — ROPIVACAINE HYDROCHLORIDE 2 MG/ML
INJECTION, SOLUTION EPIDURAL; INFILTRATION; PERINEURAL
Status: DISCONTINUED | OUTPATIENT
Start: 2019-06-18 | End: 2019-06-18

## 2019-06-18 RX ORDER — TRANEXAMIC ACID 100 MG/ML
1000 INJECTION, SOLUTION INTRAVENOUS ONCE
Status: DISCONTINUED | OUTPATIENT
Start: 2019-06-18 | End: 2019-06-18 | Stop reason: HOSPADM

## 2019-06-18 RX ORDER — SODIUM CHLORIDE 0.9 % (FLUSH) 0.9 %
10 SYRINGE (ML) INJECTION
Status: DISCONTINUED | OUTPATIENT
Start: 2019-06-18 | End: 2019-06-18

## 2019-06-18 RX ORDER — OXYCODONE HYDROCHLORIDE 5 MG/1
5 TABLET ORAL EVERY 4 HOURS PRN
Status: DISCONTINUED | OUTPATIENT
Start: 2019-06-18 | End: 2019-06-19 | Stop reason: HOSPADM

## 2019-06-18 RX ORDER — BUPIVACAINE HYDROCHLORIDE AND EPINEPHRINE 5; 5 MG/ML; UG/ML
30 INJECTION, SOLUTION EPIDURAL; INTRACAUDAL; PERINEURAL ONCE
Status: DISCONTINUED | OUTPATIENT
Start: 2019-06-18 | End: 2019-06-18 | Stop reason: HOSPADM

## 2019-06-18 RX ORDER — ACETAMINOPHEN 325 MG/1
650 TABLET ORAL EVERY 4 HOURS PRN
Status: DISCONTINUED | OUTPATIENT
Start: 2019-06-18 | End: 2019-06-19 | Stop reason: HOSPADM

## 2019-06-18 RX ORDER — HYDROCHLOROTHIAZIDE 25 MG/1
25 TABLET ORAL DAILY
Status: DISCONTINUED | OUTPATIENT
Start: 2019-06-18 | End: 2019-06-19 | Stop reason: HOSPADM

## 2019-06-18 RX ORDER — GLIMEPIRIDE 2 MG/1
4 TABLET ORAL
Status: DISCONTINUED | OUTPATIENT
Start: 2019-06-19 | End: 2019-06-19 | Stop reason: HOSPADM

## 2019-06-18 RX ORDER — SODIUM CHLORIDE, SODIUM LACTATE, POTASSIUM CHLORIDE, CALCIUM CHLORIDE 600; 310; 30; 20 MG/100ML; MG/100ML; MG/100ML; MG/100ML
INJECTION, SOLUTION INTRAVENOUS CONTINUOUS
Status: DISCONTINUED | OUTPATIENT
Start: 2019-06-18 | End: 2019-06-18

## 2019-06-18 RX ORDER — BUPIVACAINE HCL/EPINEPHRINE 0.5-1:200K
VIAL (ML) INJECTION
Status: DISCONTINUED | OUTPATIENT
Start: 2019-06-18 | End: 2019-06-18 | Stop reason: HOSPADM

## 2019-06-18 RX ORDER — PREGABALIN 75 MG/1
75 CAPSULE ORAL 2 TIMES DAILY
Status: DISCONTINUED | OUTPATIENT
Start: 2019-06-18 | End: 2019-06-19 | Stop reason: HOSPADM

## 2019-06-18 RX ORDER — HYDROMORPHONE HYDROCHLORIDE 2 MG/ML
INJECTION, SOLUTION INTRAMUSCULAR; INTRAVENOUS; SUBCUTANEOUS
Status: COMPLETED
Start: 2019-06-18 | End: 2019-06-18

## 2019-06-18 RX ORDER — CEFAZOLIN SODIUM 2 G/50ML
2 SOLUTION INTRAVENOUS
Status: COMPLETED | OUTPATIENT
Start: 2019-06-18 | End: 2019-06-18

## 2019-06-18 RX ORDER — ONDANSETRON 2 MG/ML
4 INJECTION INTRAMUSCULAR; INTRAVENOUS EVERY 8 HOURS PRN
Status: DISCONTINUED | OUTPATIENT
Start: 2019-06-18 | End: 2019-06-19 | Stop reason: HOSPADM

## 2019-06-18 RX ORDER — NEOSTIGMINE METHYLSULFATE 1 MG/ML
INJECTION, SOLUTION INTRAVENOUS
Status: DISCONTINUED | OUTPATIENT
Start: 2019-06-18 | End: 2019-06-18

## 2019-06-18 RX ORDER — GLUCAGON 1 MG
1 KIT INJECTION
Status: DISCONTINUED | OUTPATIENT
Start: 2019-06-18 | End: 2019-06-19 | Stop reason: HOSPADM

## 2019-06-18 RX ORDER — ACETAMINOPHEN 10 MG/ML
1000 INJECTION, SOLUTION INTRAVENOUS ONCE
Status: DISCONTINUED | OUTPATIENT
Start: 2019-06-18 | End: 2019-06-18 | Stop reason: HOSPADM

## 2019-06-18 RX ORDER — ATORVASTATIN CALCIUM 20 MG/1
20 TABLET, FILM COATED ORAL DAILY
Status: DISCONTINUED | OUTPATIENT
Start: 2019-06-18 | End: 2019-06-19 | Stop reason: HOSPADM

## 2019-06-18 RX ORDER — EPHEDRINE SULFATE 50 MG/ML
INJECTION, SOLUTION INTRAVENOUS
Status: DISCONTINUED | OUTPATIENT
Start: 2019-06-18 | End: 2019-06-18

## 2019-06-18 RX ORDER — IBUPROFEN 200 MG
24 TABLET ORAL
Status: DISCONTINUED | OUTPATIENT
Start: 2019-06-18 | End: 2019-06-19 | Stop reason: HOSPADM

## 2019-06-18 RX ORDER — ROCURONIUM BROMIDE 10 MG/ML
INJECTION, SOLUTION INTRAVENOUS
Status: DISCONTINUED | OUTPATIENT
Start: 2019-06-18 | End: 2019-06-18

## 2019-06-18 RX ORDER — PANTOPRAZOLE SODIUM 40 MG/1
40 TABLET, DELAYED RELEASE ORAL
Status: DISCONTINUED | OUTPATIENT
Start: 2019-06-18 | End: 2019-06-19 | Stop reason: HOSPADM

## 2019-06-18 RX ORDER — FENTANYL CITRATE 50 UG/ML
INJECTION, SOLUTION INTRAMUSCULAR; INTRAVENOUS
Status: DISCONTINUED | OUTPATIENT
Start: 2019-06-18 | End: 2019-06-18

## 2019-06-18 RX ORDER — GENTAMICIN SULFATE 40 MG/ML
160 INJECTION, SOLUTION INTRAMUSCULAR; INTRAVENOUS ONCE
Status: DISCONTINUED | OUTPATIENT
Start: 2019-06-18 | End: 2019-06-18 | Stop reason: HOSPADM

## 2019-06-18 RX ORDER — CEFAZOLIN SODIUM 2 G/50ML
2 SOLUTION INTRAVENOUS ONCE
Status: COMPLETED | OUTPATIENT
Start: 2019-06-18 | End: 2019-06-18

## 2019-06-18 RX ORDER — MUPIROCIN 20 MG/G
1 OINTMENT TOPICAL 2 TIMES DAILY
Status: DISCONTINUED | OUTPATIENT
Start: 2019-06-18 | End: 2019-06-19 | Stop reason: HOSPADM

## 2019-06-18 RX ADMIN — HYDROMORPHONE HYDROCHLORIDE 0.4 MG: 2 INJECTION INTRAMUSCULAR; INTRAVENOUS; SUBCUTANEOUS at 09:06

## 2019-06-18 RX ADMIN — TRANEXAMIC ACID 1000 MG: 100 INJECTION, SOLUTION INTRAVENOUS at 08:06

## 2019-06-18 RX ADMIN — DILTIAZEM HYDROCHLORIDE 240 MG: 120 CAPSULE, COATED, EXTENDED RELEASE ORAL at 12:06

## 2019-06-18 RX ADMIN — Medication 20 MG: at 08:06

## 2019-06-18 RX ADMIN — EPHEDRINE SULFATE 5 MG: 50 INJECTION, SOLUTION INTRAMUSCULAR; INTRAVENOUS; SUBCUTANEOUS at 07:06

## 2019-06-18 RX ADMIN — INSULIN ASPART 2 UNITS: 100 INJECTION, SOLUTION INTRAVENOUS; SUBCUTANEOUS at 08:06

## 2019-06-18 RX ADMIN — FENTANYL CITRATE 50 MCG: 50 INJECTION, SOLUTION INTRAMUSCULAR; INTRAVENOUS at 07:06

## 2019-06-18 RX ADMIN — INSULIN ASPART 4 UNITS: 100 INJECTION, SOLUTION INTRAVENOUS; SUBCUTANEOUS at 06:06

## 2019-06-18 RX ADMIN — CEFAZOLIN SODIUM 2 G: 2 SOLUTION INTRAVENOUS at 04:06

## 2019-06-18 RX ADMIN — PREGABALIN 75 MG: 75 CAPSULE ORAL at 08:06

## 2019-06-18 RX ADMIN — NEOSTIGMINE METHYLSULFATE 5 MG: 1 INJECTION INTRAVENOUS at 08:06

## 2019-06-18 RX ADMIN — MIDAZOLAM 1 MG: 1 INJECTION INTRAMUSCULAR; INTRAVENOUS at 07:06

## 2019-06-18 RX ADMIN — MUPIROCIN 1 G: 20 OINTMENT TOPICAL at 08:06

## 2019-06-18 RX ADMIN — OXYCODONE HYDROCHLORIDE 5 MG: 5 TABLET ORAL at 03:06

## 2019-06-18 RX ADMIN — ZOLPIDEM TARTRATE 5 MG: 5 TABLET ORAL at 09:06

## 2019-06-18 RX ADMIN — OXYCODONE HYDROCHLORIDE 5 MG: 5 TABLET ORAL at 08:06

## 2019-06-18 RX ADMIN — SODIUM CHLORIDE, SODIUM LACTATE, POTASSIUM CHLORIDE, AND CALCIUM CHLORIDE: .6; .31; .03; .02 INJECTION, SOLUTION INTRAVENOUS at 12:06

## 2019-06-18 RX ADMIN — GLYCOPYRROLATE 0.6 MG: 0.2 INJECTION, SOLUTION INTRAMUSCULAR; INTRAVENOUS at 08:06

## 2019-06-18 RX ADMIN — ONDANSETRON 4 MG: 2 INJECTION, SOLUTION INTRAMUSCULAR; INTRAVENOUS at 08:06

## 2019-06-18 RX ADMIN — PREGABALIN 75 MG: 75 CAPSULE ORAL at 01:06

## 2019-06-18 RX ADMIN — DOCUSATE SODIUM 100 MG: 100 CAPSULE, LIQUID FILLED ORAL at 01:06

## 2019-06-18 RX ADMIN — SODIUM CHLORIDE, SODIUM LACTATE, POTASSIUM CHLORIDE, AND CALCIUM CHLORIDE: .6; .31; .03; .02 INJECTION, SOLUTION INTRAVENOUS at 07:06

## 2019-06-18 RX ADMIN — CEFAZOLIN SODIUM 2 G: 2 SOLUTION INTRAVENOUS at 11:06

## 2019-06-18 RX ADMIN — MUPIROCIN 1 G: 20 OINTMENT TOPICAL at 01:06

## 2019-06-18 RX ADMIN — FENTANYL CITRATE 50 MCG: 50 INJECTION, SOLUTION INTRAMUSCULAR; INTRAVENOUS at 08:06

## 2019-06-18 RX ADMIN — INSULIN ASPART 4 UNITS: 100 INJECTION, SOLUTION INTRAVENOUS; SUBCUTANEOUS at 01:06

## 2019-06-18 RX ADMIN — Medication 20 MG: at 07:06

## 2019-06-18 RX ADMIN — TRANEXAMIC ACID 1000 MG: 100 INJECTION, SOLUTION INTRAVENOUS at 07:06

## 2019-06-18 RX ADMIN — ROCURONIUM BROMIDE 50 MG: 10 INJECTION, SOLUTION INTRAVENOUS at 07:06

## 2019-06-18 RX ADMIN — ROPIVACAINE HYDROCHLORIDE 20 ML: 2 INJECTION, SOLUTION EPIDURAL; INFILTRATION at 09:06

## 2019-06-18 RX ADMIN — HYDROMORPHONE HYDROCHLORIDE 0.5 MG: 2 INJECTION, SOLUTION INTRAMUSCULAR; INTRAVENOUS; SUBCUTANEOUS at 10:06

## 2019-06-18 RX ADMIN — PHENYLEPHRINE HYDROCHLORIDE 150 MCG: 10 INJECTION INTRAVENOUS at 07:06

## 2019-06-18 RX ADMIN — MIDAZOLAM 1 MG: 1 INJECTION INTRAMUSCULAR; INTRAVENOUS at 06:06

## 2019-06-18 RX ADMIN — SODIUM CHLORIDE, SODIUM LACTATE, POTASSIUM CHLORIDE, AND CALCIUM CHLORIDE: .6; .31; .03; .02 INJECTION, SOLUTION INTRAVENOUS at 06:06

## 2019-06-18 RX ADMIN — PHENYLEPHRINE HYDROCHLORIDE 100 MCG: 10 INJECTION INTRAVENOUS at 07:06

## 2019-06-18 RX ADMIN — LIDOCAINE HYDROCHLORIDE 100 MG: 20 INJECTION, SOLUTION INTRAVENOUS at 07:06

## 2019-06-18 RX ADMIN — PROPOFOL 20 MG: 10 INJECTION, EMULSION INTRAVENOUS at 07:06

## 2019-06-18 RX ADMIN — DEXAMETHASONE SODIUM PHOSPHATE 10 MG: 4 INJECTION, SOLUTION INTRAMUSCULAR; INTRAVENOUS at 07:06

## 2019-06-18 RX ADMIN — HYDROMORPHONE HYDROCHLORIDE 0.6 MG: 2 INJECTION INTRAMUSCULAR; INTRAVENOUS; SUBCUTANEOUS at 08:06

## 2019-06-18 RX ADMIN — CEFAZOLIN SODIUM 2 G: 2 SOLUTION INTRAVENOUS at 07:06

## 2019-06-18 RX ADMIN — APIXABAN 5 MG: 5 TABLET, FILM COATED ORAL at 03:06

## 2019-06-18 RX ADMIN — PROPOFOL 160 MG: 10 INJECTION, EMULSION INTRAVENOUS at 07:06

## 2019-06-18 RX ADMIN — SODIUM CHLORIDE, SODIUM LACTATE, POTASSIUM CHLORIDE, AND CALCIUM CHLORIDE: .6; .31; .03; .02 INJECTION, SOLUTION INTRAVENOUS at 11:06

## 2019-06-18 RX ADMIN — FENTANYL CITRATE 100 MCG: 50 INJECTION, SOLUTION INTRAMUSCULAR; INTRAVENOUS at 07:06

## 2019-06-18 RX ADMIN — ATORVASTATIN CALCIUM 20 MG: 20 TABLET, FILM COATED ORAL at 01:06

## 2019-06-18 RX ADMIN — HYDROCHLOROTHIAZIDE 25 MG: 25 TABLET ORAL at 01:06

## 2019-06-18 RX ADMIN — INSULIN DETEMIR 10 UNITS: 100 INJECTION, SOLUTION SUBCUTANEOUS at 01:06

## 2019-06-18 RX ADMIN — DOCUSATE SODIUM 100 MG: 100 CAPSULE, LIQUID FILLED ORAL at 08:06

## 2019-06-18 NOTE — TRANSFER OF CARE
Anesthesia Transfer of Care Note    Patient: Ramirez Aguilar Jr.    Procedure(s) Performed: Procedure(s) (LRB):  ARTHROPLASTY, KNEE, TOTAL (Right)    Patient location: PACU    Anesthesia Type: general and regional    Transport from OR: Transported from OR on 6-10 L/min O2 by face mask with adequate spontaneous ventilation    Post pain: adequate analgesia    Post assessment: no apparent anesthetic complications    Post vital signs: stable    Level of consciousness: awake and alert    Nausea/Vomiting: no nausea/vomiting    Complications: none    Transfer of care protocol was followed      Last vitals:   Visit Vitals  BP (!) 156/101   Pulse 96   Temp 36.4 °C (97.5 °F) (Temporal)   Resp 18   Ht 6' (1.829 m)   Wt 113.4 kg (250 lb)   SpO2 100%   BMI 33.91 kg/m²

## 2019-06-18 NOTE — ANESTHESIA PROCEDURE NOTES
Peripheral Block    Patient location during procedure: post-op   Block not for primary anesthetic.  Reason for block: at surgeon's request and post-op pain management   Post-op Pain Location: right knee pain  Start time: 6/18/2019 9:45 AM  Timeout: 6/18/2019 9:45 AM   End time: 6/18/2019 9:55 AM  Staffing  Anesthesiologist: Rickey Lopes MD  Resident/CRNA: Lesly Peters MD  Performed: resident/CRNA   Preanesthetic Checklist  Completed: patient identified, site marked, surgical consent, pre-op evaluation, timeout performed, IV checked, risks and benefits discussed and monitors and equipment checked  Peripheral Block  Patient position: supine  Prep: ChloraPrep  Patient monitoring: heart rate, cardiac monitor, continuous pulse ox, continuous capnometry and frequent blood pressure checks  Block type: adductor canal  Laterality: right  Injection technique: single shot  Needle  Needle type: Echogenic   Needle gauge: 22 G  Needle length: 4 in  Needle localization: anatomical landmarks     Assessment  Injection assessment: negative aspiration, negative parasthesia and local visualized surrounding nerve  Paresthesia pain: none  Heart rate change: no  Slow fractionated injection: yes

## 2019-06-18 NOTE — PLAN OF CARE
"VSS. Denies pain or discomfort @ this time. "Ok to release to room @ this time", per Dr Lopes. Report given to ENOC Alcantara, with time allotted for questions. On q ball connected @ 8ml/hr by Dr Edmondson.   "

## 2019-06-18 NOTE — PLAN OF CARE
VN cued into pt's room for introduction with pt's permission. Family at bedside. VN role explained and informed pt that VN would be working with bedside nurse and the rest of the care team. Pt stated that he feels like his sugar is low and is feeling shaky. VN notified. Bedside nurse Maricruz. Will complete admission questions when pt feels better. Will cont to be available as needed.

## 2019-06-18 NOTE — PLAN OF CARE
Unable to waste Dilaudid in Pyxis. Pharmacist notified. Nicolas in pharmacy also notified.  0.5mg IV given. Unable to waste 1.5mg in Pyxis. Wasted per protocol with ENOC Vásquez. Nicolas from pharmacy came to PACU, unable to fix issue.

## 2019-06-18 NOTE — BRIEF OP NOTE
[facility]  Brief Operative Note    SUMMARY     Surgery Date: 6/18/2019     Surgeon(s) and Role:     * Kiko Garcia MD - Primary    Assisting Surgeon: None    Pre-op Diagnosis:  Degenerative joint disease of right knee [M17.11]    Post-op Diagnosis:  Post-Op Diagnosis Codes:     * Degenerative joint disease of right knee [M17.11]    Procedure(s) (LRB):  ARTHROPLASTY, KNEE, TOTAL (Right)    Anesthesia: General    Description of Procedure: Rt TKA    Description of the findings of the procedure: advanced 3 compartment DJD    Estimated Blood Loss:none         Specimens:   Specimen (12h ago, onward)    None

## 2019-06-18 NOTE — PLAN OF CARE
Problem: Occupational Therapy Goal  Goal: Occupational Therapy Goal  Goals to be met by: 7/18/19     Patient will increase functional independence with ADLs by performing:    LE Dressing with Modified [place].  Grooming while standing at sink with Modified [place].  Toileting from toilet with Modified [place] for hygiene and clothing management.   Toilet transfer to toilet with Modified [place].    Outcome: Ongoing (interventions implemented as appropriate)  OT eval performed. Report to follow.     Anticipate  OT/PT.

## 2019-06-18 NOTE — PLAN OF CARE
VN cued into pt's room to complete admission questions. Pt currently working with therapy. Will attempt to come back later.

## 2019-06-18 NOTE — ANESTHESIA PROCEDURE NOTES
Peripheral Block    Patient location during procedure: post-op   Block not for primary anesthetic.  Reason for block: at surgeon's request and post-op pain management   Post-op Pain Location: right knee  Start time: 6/18/2019 9:30 AM  Timeout: 6/18/2019 9:30 AM   End time: 6/18/2019 9:35 AM  Staffing  Anesthesiologist: Rickey Lopes MD  Resident/CRNA: Lesly Peters MD  Performed: resident/CRNA   Preanesthetic Checklist  Completed: patient identified, site marked, surgical consent, pre-op evaluation, timeout performed, IV checked, risks and benefits discussed and monitors and equipment checked  Peripheral Block  Patient position: supine  Prep: ChloraPrep and site prepped and draped  Patient monitoring: heart rate, cardiac monitor, continuous pulse ox, continuous capnometry and frequent blood pressure checks  Block type: I PACK  Laterality: right  Injection technique: single shot  Needle  Needle type: Echogenic   Needle gauge: 22 G  Needle length: 4 in  Needle localization: ultrasound guidance  Needle insertion depth: 6 cm  Test dose: negative   -ultrasound image captured on disc.  Assessment  Injection assessment: negative aspiration, negative parasthesia and local visualized surrounding nerve  Paresthesia pain: none  Heart rate change: no  Slow fractionated injection: yes

## 2019-06-18 NOTE — PLAN OF CARE
Pt states name and . Verified to armband. Pt verifies procedure to be done. Verified to consent x2 and EPIC chart. Placed on cardiac monitor x3, and pulse ox.       0936 nerve block started.       0944 nerve block complete.

## 2019-06-18 NOTE — PT/OT/SLP EVAL
"Occupational Therapy   Evaluation    Name: Ramirez Aguilar Jr.  MRN: [id number]  Admitting Diagnosis:  <principal problem not specified> Day of Surgery    Recommendations:     Discharge Recommendations: home health PT, home health OT  Discharge Equipment Recommendations:  none  Barriers to discharge:  None    Assessment:     Ramirez Aguilar Jr. is a 69 y.o. male with a medical diagnosis of <principal problem not specified>.  He presents with performance deficits affecting function: weakness, impaired self care skills, impaired balance, decreased ROM, impaired skin, pain, impaired functional mobilty, impaired endurance, gait instability, decreased lower extremity function, orthopedic precautions.      Rehab Prognosis: Good; patient would benefit from acute skilled OT services to address these deficits and reach maximum level of function.       Plan:     Patient to be seen 5 x/week to address the above listed problems via self-care/home management, therapeutic activities, therapeutic exercises  · Plan of Care Expires: 07/18/19  · Plan of Care Reviewed with: patient, spouse    Subjective     Chief Complaint:   Patient/Family Comments/goals: Return to [facility]    Occupational Profile:  Living Environment: Lives w/ spouse in [facility], [facility] (being built next week w/ grab bars & bench)  Previous level of function: Independent in ADLs  Roles and Routines:   Equipment Used at Home:  cane, straight, CPAP, shower chair, raised toilet  Assistance upon Discharge: Family    Pain/Comfort:  · Pain Rating 1: 0/10  · Location - Side 1: Right  · Location - Orientation 1: generalized  · Location 1: knee  · Pain Addressed 1: Pre-medicate for activity, Cessation of Activity, Reposition, Distraction, Nurse notified  · Pain Rating Post-Intervention 1: other (see comments)("0.5")    Patients cultural, spiritual, Buddhism conflicts given the current situation: no    Objective:     Communicated with: nurse prior to session.  Patient found HOB elevated " with peripheral IV, bed alarm, oxygen upon OT entry to room.    General Precautions: Standard, fall   Orthopedic Precautions:RLE weight bearing as tolerated   Braces: N/A     Occupational Performance:    Bed Mobility:    · Patient completed Rolling/Turning to Left with  supervision and stand by assistance  · Patient completed Scooting/Bridging with supervision and stand by assistance  · Patient completed Supine to Sit with supervision and stand by assistance  · Patient completed Sit to Supine with supervision and stand by assistance    Functional Mobility/Transfers:  · Patient completed Sit <> Stand Transfer with contact guard assistance  with  rolling walker   · Patient completed Toilet Transfer Step Transfer technique with contact guard assistance and minimum assistance with  rolling walker  · Functional Mobility: Pt ambulated ~25' in room w/ CGA - min A and RW     Activities of Daily Living:  · Toileting: contact guard assistance and minimum assistance to urinate standing at toilet    Cognitive/Visual Perceptual:  AO4    Physical Exam:  B UE AROM shoulder flexion slightly limited ~100 deg. Strength R UE slightly weaker than L UE. Good-/Fair+ sit balance; Fair-/Fair stand balance. Moderate activity tolerance.     AMPAC 6 Click ADL:  AMPAC Total Score: 19    Treatment & Education:  Pt/wife educated on role of OT/POC and agreeable to tx. Educated pt on pain  as well as proper use of RW and adherence to safety measures during ADLs and ambulation.   Education:    Patient left HOB elevated with all lines intact, call button in reach, bed alarm on, nurse notified and spouse present    GOALS:   Multidisciplinary Problems     Occupational Therapy Goals        Problem: Occupational Therapy Goal    Goal Priority Disciplines Outcome Interventions   Occupational Therapy Goal     OT, PT/OT Ongoing (interventions implemented as appropriate)    Description:  Goals to be met by: 7/18/19     Patient will increase  functional independence with ADLs by performing:    LE Dressing with Modified [place].  Grooming while standing at sink with Modified [place].  Toileting from toilet with Modified [place] for hygiene and clothing management.   Toilet transfer to toilet with Modified [place].                      History:     Past Medical History:   Diagnosis Date    A-fib     Arthritis     Diabetes mellitus     Hyperlipidemia     Hypertension     KISHAN on CPAP        Past Surgical History:   Procedure Laterality Date    COLONOSCOPY  2007    COLONOSCOPY/ Split dose N/A 9/18/2017    Performed by [facility] Jr[facility] at [facility] ENDO    KNEE ARTHROSCOPY Right     UPPER GASTROINTESTINAL ENDOSCOPY  2007       Time Tracking:     OT Date of Treatment: 06/18/19  OT Start Time: 1408  OT Stop Time: 1438  OT Total Time (min): 30 min w/ PT    Billable Minutes:Evaluation 15    CHELY Becerra  6/18/2019

## 2019-06-18 NOTE — PT/OT/SLP EVAL
"Physical Therapy Evaluation and Treatment    Patient Name:  Ramirez Aguilar Jr.   MRN:  [id number]    Recommendations:     Discharge Recommendations:  home health PT, home health OT   Discharge Equipment Recommendations: none   Barriers to discharge: None    Assessment:     Ramirez Aguilar Jr. is a 69 y.o. male admitted with a medical diagnosis of R TKA 9day of surgery.  He presents with the following impairments/functional limitations:  weakness, impaired endurance, impaired self care skills, impaired functional mobilty, gait instability, impaired balance, decreased lower extremity function, decreased ROM, pain, impaired skin. Pt and pt's wife educated on RLE positioning with elevation to promote extension, RLE exercises, gait training with RW, and use of ice pack for pain management. Pt ambulated 12 ft with RW and CGA with cues for 3-point gait pattern and pt with decreased step length/foot clearance and limited R hip/knee flexion. Recommending HH PT/OT upon d/c.    Rehab Prognosis: Good; patient would benefit from acute skilled PT services to address these deficits and reach maximum level of function.    Recent Surgery: Procedure(s) (LRB):  ARTHROPLASTY, KNEE, TOTAL (Right) Day of Surgery    Plan:     During this hospitalization, patient to be seen BID to address the identified rehab impairments via gait training, therapeutic activities, therapeutic exercises and progress toward the following goals:    · Plan of Care Expires:  07/18/19    Subjective     Chief Complaint: none  Patient/Family Comments/goals: "I'm surprised I'm doing so well, I didn't think I'd be able to put weight through that leg"  Pain/Comfort:  · Pain Rating 1: 0/10  · Location - Side 1: Right  · Location - Orientation 1: generalized  · Location 1: knee  · Pain Addressed 1: Pre-medicate for activity, Reposition, Distraction, Cessation of Activity, Nurse notified  · Pain Rating Post-Intervention 1: (0.5/10)    Patients cultural, spiritual, Christian " conflicts given the current situation: no    Living Environment:  Pt lives with his wife in a SSH with THE and his bathroom is being re-done and will have a WIS with grab bars and built in seat next week.  Prior to admission, patients level of function was mod I level for mobility and ADLs PTA.  Equipment used at home: cane, straight, bedside commode, walker, rolling, shower chair.  DME owned (not currently used): was not using DME.  Upon discharge, patient will have assistance from his wife who reported she will be home with him at all times.    Objective:     Communicated with ENOC Alcantara prior to session.  Patient found HOB elevated with bed alarm, peripheral IV, oxygen(Q-ball)  upon PT entry to room.    General Precautions: Standard, fall   Orthopedic Precautions:RLE weight bearing as tolerated   Braces: N/A     Exams:  · Postural Exam:  Patient presented with the following abnormalities:    · -       Rounded shoulders  · Sensation:    · -       Intact  · Skin Integrity/Edema:      · -       Skin integrity: surgical incision R knee - ACE wrapped  · RLE ROM: WFL except knee AROM ~30-70 deg  · RLE Strength: ankle DF WFLs, knee/hip 3-/5  · LLE ROM: WFL  · LLE Strength: WFL except hip flexion 3+/5    Functional Mobility:  · Bed Mobility:     · Scooting: stand by assistance  · Supine to Sit: contact guard assistance  · Sit to Supine: minimum assistance  · Transfers:     · Sit to Stand:  contact guard assistance and minimum assistance with rolling walker  · Toilet Transfer: contact guard assistance with  rolling walker  using  Step Transfer  · Gait: 12 ft and 6 ft with RW and CGA with max cues for 3-point gait pattern, increased UE WB to off-weight RLE as needed, RLE activation during stance phase, and to increase RW propulsion to take slightly larger steps. Pt ambulates with minimal RLE hip/knee ROM      Therapeutic Activities and Exercises:  Pt and pt's wife educated on RLE positioning with elevation to promote  extension, RLE exercises, gait training with RW, and use of ice pack for pain management.   Pt ambulated to toilet, stood to urinate, then ambulated back to bed.  Upon return to supine, pt educated on APs, heel slides, and QS/GS.    Pt and pt's wife asking about GITA hose and SCDs, notified RN and she is aware.    AM-PAC 6 CLICK MOBILITY  Total Score:17     Patient left HOB elevated with all lines intact, call button in reach, bed alarm on, RN notified and pt's wife present.    GOALS:   Multidisciplinary Problems     Physical Therapy Goals        Problem: Physical Therapy Goal    Goal Priority Disciplines Outcome Goal Variances Interventions   Physical Therapy Goal     PT, PT/OT Ongoing (interventions implemented as appropriate)     Description:  Goals to be met by: 19     Patient will increase functional independence with mobility by performin. Supine <> sit with supervision  2. Sit to stand transfer with Supervision  3. Bed to chair transfer with Supervision using Rolling Walker  4. Gait  x 100 feet with Supervision using Rolling Walker.   5. Lower extremity exercise program x 10 reps per handout, with supervision                      History:     Past Medical History:   Diagnosis Date    A-fib     Arthritis     Diabetes mellitus     Hyperlipidemia     Hypertension     KISHAN on CPAP        Past Surgical History:   Procedure Laterality Date    COLONOSCOPY      COLONOSCOPY/ Split dose N/A 2017    Performed by Subhash Cordova Jr., MD at [facility] ENDO    KNEE ARTHROSCOPY Right     UPPER GASTROINTESTINAL ENDOSCOPY         Time Tracking:     PT Received On: 19  PT Start Time: 1407     PT Stop Time: 1438  PT Total Time (min): 31 min     Billable Minutes: Evaluation 15 and Gait Training 16      Akosua Newell, PT  2019

## 2019-06-18 NOTE — PLAN OF CARE
Problem: Physical Therapy Goal  Goal: Physical Therapy Goal  Goals to be met by: 19     Patient will increase functional independence with mobility by performin. Supine <> sit with supervision  2. Sit to stand transfer with Supervision  3. Bed to chair transfer with Supervision using Rolling Walker  4. Gait  x 100 feet with Supervision using Rolling Walker.   5. Lower extremity exercise program x 10 reps per handout, with supervision    Outcome: Ongoing (interventions implemented as appropriate)  PT evaluation completed, note to follow. Pt and pt's wife educated on RLE positioning with elevation to promote extension, RLE exercises, gait training with RW, and use of ice pack for pain management. Pt ambulated 12 ft with RW and CGA with cues for 3-point gait pattern and pt with decreased step length/foot clearance and limited R hip/knee flexion. Recommending HH PT/OT upon d/c.       Viagra 50 mg qD prn, #10/3 rf

## 2019-06-18 NOTE — PLAN OF CARE
VN cued into pt's room with pt's permission. Pt resting in bed. Fall risk protocol discussed with pt. VN instructed pt to call for assistance. Pt aware and agreeable. NAD noted. Allowed time for questions. Pt stated he worked with therapy and did well. Admission questions completed. Will cont to be available and intervene as needed.        06/18/19 2686   Type of Frequent Check   Type Patient Rounds;Other (see comments)  (VN rounds)   Safety/Activity   Patient Rounds visualized patient;call light in patient/parent reach   Safety Promotion/Fall Prevention Fall Risk reviewed with patient/family;assistive device/personal item within reach;instructed to call staff for mobility   Pain/Comfort/Sleep   Preferred Pain Scale number (Numeric Rating Pain Scale)   Pain Body Location knee   Pain Rating (0-10): Rest 1   Sleep/Rest/Relaxation awake;no problem identified   Assessments (Pre/Post)   Level of Consciousness (AVPU) alert

## 2019-06-18 NOTE — ANESTHESIA POSTPROCEDURE EVALUATION
Anesthesia Post Evaluation    Patient: Ramirez Aguilar Jr.    Procedure(s) Performed: Procedure(s) (LRB):  ARTHROPLASTY, KNEE, TOTAL (Right)    Final Anesthesia Type: general  Patient location during evaluation: PACU  Patient participation: Yes- Able to Participate  Level of consciousness: awake and alert, oriented and awake  Post-procedure vital signs: reviewed and stable  Pain management: adequate  Airway patency: patent  PONV status at discharge: No PONV  Anesthetic complications: no      Cardiovascular status: blood pressure returned to baseline  Respiratory status: unassisted and room air  Hydration status: euvolemic  Follow-up not needed.          Vitals Value Taken Time   /85 6/18/2019 11:16 AM   Temp 36.5 °C (97.7 °F) 6/18/2019 11:00 AM   Pulse 92 6/18/2019 11:19 AM   Resp 14 6/18/2019 11:19 AM   SpO2 100 % 6/18/2019 11:19 AM   Vitals shown include unvalidated device data.      Event Time     Out of Recovery 11:17:01          Pain/Karolina Score: Pain Rating Prior to Med Admin: 6 (6/18/2019 10:10 AM)  Karolina Score: 9 (6/18/2019 11:15 AM)

## 2019-06-18 NOTE — OP NOTE
DATE OF PROCEDURE:  06/18/2019.    CURRENT TIME:  9:29 a.m.    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    PROCEDURE:  Right total knee arthroplasty.    SURGEON:  Kiko Garcia M.D.    ANESTHESIA:  General endotracheal.    ESTIMATED BLOOD LOSS:  None.    SPECIMENS:  None.    COMPLICATIONS:  None.    TOURNIQUET TIME:  74 minutes at 250 mmHg.    INDICATIONS FOR PROCEDURE:  Mr. Aguilar is a 69-year-old gentleman admitted with   advanced DJD valgus deformity and flexion contracture of the right knee for   right total knee arthroplasty.  Informed consent was obtained and discussing   risks, benefits, and alternatives of surgery as noted on the consent form   including increased risk of bleeding and blood loss as the patient is on   anticoagulants, wound problems and DVT given diabetes and obesity.  Informed   consent was obtained and discussing risks, benefits as noted, the patient   understood and accepted the risks.    PROCEDURE IN DETAIL:  Surgical site marking was performed in the holding area   prior to anesthesia.  Timeout was performed in the Operating Room prior making   skin incision.  Right lower extremity was prepped and draped in usual sterile   fashion.  Preoperative prophylactic IV Ancef and tranexamic acid were given.    Leg was exsanguinated, tourniquet about the proximal thigh inflated to 250 mmHg.    A longitudinal anterior midline incision was made sharply with the scalpel and   carried sharply through subcutaneous tissue.  Medial parapatellar incision was   made and patella was prepared for resurfacing by removing 8 mm of bone and   cartilage and holes drilled for a 38 mm oval patella.  There were marked   osteophytes diffusely around the patella and the patella was laterally subluxed.    Therefore, lateral retinacular release was performed.  After drill holes were   made of the patella, the patella was  stuffed in lateral gutter and knee flexed.    Remnants of the menisci, ACL and PCL were resected.  Starting hole was made   with a drill in distal femur and the femoral intramedullary alignment guide was   inserted.  Distal femoral cut was made based off for removal of 12 mm off the   distal femur as the patient had a flexion contracture, which resulted in minimal   removal of bone along the lateral knee as the knee was a valgus knee.  The cut   was made in 4-degree valgus.  After the cut was made, AP size was applied, size   4 gave the best fit and then a size 4 AP chamfer cutting block was applied and   cuts made.  Osteophytes medially, laterally and posteriorly were resected.    Femoral box cut was made and then deep retractors were inserted.  The external   tibial alignment guide was applied.  Proximal tibial cut was made based off for   removal of 2 mm off the lateral defect side.  Osteophytes posterior lateral and   posterior medial were removed.  The knee was trialed with a 4 tibial tray, which   gave good fill of the surface.  The knee was then trialed with the 4 PS femur,   4 tibial tray and was stable and balanced with the 8 PS insert after lateral   retinacular release was performed in a pie type fashion.  The knee was then   stable and balanced in full extension and 90 degrees of flexion.  Patella   tracked well in the groove at this point as well.  Trial components were   removed.  Holes were drilled for the tibial keel.  Joint surfaces were copiously   irrigated with pulsatile lavage and dried.  One batch of methylmethacrylate   cement was mixed under vacuum and then the femoral, tibial and patellar   components were all cemented.  Any residual excess cement was removed.  The size   4 PS 8 mm thick poly insert was impacted on tibial tray and knee brought out in   full extension.  The patellar component was cemented likewise in this fashion.    The joint was copiously irrigated.  Limited synovectomy in  suprapatellar pouch   was performed of hypertrophic synovium and then the joint was closed first with   the fascia with #1 Vicryl in a figure-of-eight fashion and then #2 Vicryl in   subcutaneous in interrupted and running fashion and then the skin with 2-0   StrataFix in an interrupted subcuticular fashion.  Prineo tape with Dermabond   was applied over the incision.  The tourniquet was deflated during closure of   subcutaneous with total tourniquet time of 74 minutes at 250 mmHg.  Sterile   bandage was applied.  The patient was awakened, moved to stretcher and taken to   Recovery Room in stable condition.  No complications.    PLAN:  Ice, elevation, neurovascular checks and pain control.    COMPONENTS UTILIZED:  DePuy PFC Sigma total knee system with size 4 right cement   PS femur, size 4 fixed-bearing tibial tray, size 4 x 8 mm thick poly insert and   38 mm oval.      JANELLE  dd: 06/18/2019 09:35:22 (CDT)  td: 06/18/2019 10:00:14 (MAYANKT)  Doc ID   #[id number]  Job ID #[id number]    CC:

## 2019-06-19 VITALS
WEIGHT: 252.63 LBS | RESPIRATION RATE: 18 BRPM | SYSTOLIC BLOOD PRESSURE: 134 MMHG | TEMPERATURE: 98 F | OXYGEN SATURATION: 98 % | DIASTOLIC BLOOD PRESSURE: 79 MMHG | HEIGHT: 72 IN | BODY MASS INDEX: 34.22 KG/M2 | HEART RATE: 104 BPM

## 2019-06-19 PROBLEM — E11.9 TYPE 2 DIABETES MELLITUS WITHOUT COMPLICATION, WITH LONG-TERM CURRENT USE OF INSULIN: Status: ACTIVE | Noted: 2019-06-19

## 2019-06-19 PROBLEM — Z79.4 TYPE 2 DIABETES MELLITUS WITHOUT COMPLICATION, WITH LONG-TERM CURRENT USE OF INSULIN: Status: ACTIVE | Noted: 2019-06-19

## 2019-06-19 LAB
HCT VFR BLD AUTO: 36.5 % (ref 40–54)
POCT GLUCOSE: 233 MG/DL (ref 70–110)
POCT GLUCOSE: 273 MG/DL (ref 70–110)

## 2019-06-19 PROCEDURE — 25000003 PHARM REV CODE 250

## 2019-06-19 PROCEDURE — 97535 SELF CARE MNGMENT TRAINING: CPT | Mod: 59

## 2019-06-19 PROCEDURE — 85014 HEMATOCRIT: CPT

## 2019-06-19 PROCEDURE — 36415 COLL VENOUS BLD VENIPUNCTURE: CPT

## 2019-06-19 PROCEDURE — 94799 UNLISTED PULMONARY SVC/PX: CPT

## 2019-06-19 PROCEDURE — 97530 THERAPEUTIC ACTIVITIES: CPT

## 2019-06-19 PROCEDURE — 97110 THERAPEUTIC EXERCISES: CPT

## 2019-06-19 PROCEDURE — 94761 N-INVAS EAR/PLS OXIMETRY MLT: CPT

## 2019-06-19 PROCEDURE — 97116 GAIT TRAINING THERAPY: CPT | Mod: 59

## 2019-06-19 RX ORDER — PROMETHAZINE HYDROCHLORIDE 25 MG/1
12.5 TABLET ORAL EVERY 6 HOURS PRN
Qty: 40 TABLET | Refills: 2
Start: 2019-06-19

## 2019-06-19 RX ORDER — DOCUSATE SODIUM 100 MG/1
100 CAPSULE, LIQUID FILLED ORAL EVERY 12 HOURS
Refills: 0 | COMMUNITY
Start: 2019-06-19

## 2019-06-19 RX ORDER — OXYCODONE AND ACETAMINOPHEN 5; 325 MG/1; MG/1
1 TABLET ORAL EVERY 4 HOURS PRN
Qty: 42 TABLET | Refills: 0
Start: 2019-06-19

## 2019-06-19 RX ADMIN — PREGABALIN 75 MG: 75 CAPSULE ORAL at 09:06

## 2019-06-19 RX ADMIN — GLIMEPIRIDE 4 MG: 2 TABLET ORAL at 05:06

## 2019-06-19 RX ADMIN — DOCUSATE SODIUM 100 MG: 100 CAPSULE, LIQUID FILLED ORAL at 09:06

## 2019-06-19 RX ADMIN — INSULIN ASPART 4 UNITS: 100 INJECTION, SOLUTION INTRAVENOUS; SUBCUTANEOUS at 12:06

## 2019-06-19 RX ADMIN — PANTOPRAZOLE SODIUM 40 MG: 40 TABLET, DELAYED RELEASE ORAL at 05:06

## 2019-06-19 RX ADMIN — LOSARTAN POTASSIUM 25 MG: 25 TABLET ORAL at 09:06

## 2019-06-19 RX ADMIN — OXYCODONE HYDROCHLORIDE 5 MG: 5 TABLET ORAL at 03:06

## 2019-06-19 RX ADMIN — OXYCODONE HYDROCHLORIDE 5 MG: 5 TABLET ORAL at 01:06

## 2019-06-19 RX ADMIN — HYDROCHLOROTHIAZIDE 25 MG: 25 TABLET ORAL at 09:06

## 2019-06-19 RX ADMIN — DILTIAZEM HYDROCHLORIDE 240 MG: 120 CAPSULE, COATED, EXTENDED RELEASE ORAL at 09:06

## 2019-06-19 RX ADMIN — INSULIN ASPART 4 UNITS: 100 INJECTION, SOLUTION INTRAVENOUS; SUBCUTANEOUS at 05:06

## 2019-06-19 RX ADMIN — OXYCODONE HYDROCHLORIDE 5 MG: 5 TABLET ORAL at 09:06

## 2019-06-19 RX ADMIN — METFORMIN HYDROCHLORIDE 750 MG: 750 TABLET, EXTENDED RELEASE ORAL at 09:06

## 2019-06-19 RX ADMIN — APIXABAN 5 MG: 5 TABLET, FILM COATED ORAL at 09:06

## 2019-06-19 RX ADMIN — MUPIROCIN 1 G: 20 OINTMENT TOPICAL at 09:06

## 2019-06-19 NOTE — PLAN OF CARE
Problem: Adult Inpatient Plan of Care  Goal: Plan of Care Review  Outcome: Ongoing (interventions implemented as appropriate)  Patient on oxygen with documented flow.  Will attempt to wean per O2 order protocol. Will continue to monitor.  \

## 2019-06-19 NOTE — DISCHARGE INSTRUCTIONS
Promethazine tablets (English) View Edit Remove   Acetaminophen; Oxycodone capsules (English) View Edit Remove   When to Call Your Surgeon After Knee Replacement (English) View Edit Remove   Total Knee Replacement, Discharge Instructions for (English) View Edit Remove   Docusate capsules (English) View Edit Remove

## 2019-06-19 NOTE — PLAN OF CARE
This  put name on white board and explained blue discharge folder to patient. Discharge planning brochure and/or business card given to patient.  Patient verbalized understanding.    TN met with patient. Prior to arrival he was living with his wife and driving. Pt states that he has a rolling walker, BSC, straight cane, and shower chair. Pt having walk in shower installed next week. Pt states that he has not had a need for HH in the past and has no preference to company used. Wife is on her way to the hospital and will bring patient home. Will continue to monitor needs.     Follow-up With  Details  Why  Contact Info   Kiko Garcia MD  On 7/3/2019  Appt at 930am  [address] [facility]  [place] [address]  [place] [address]  [phone]        06/19/19 0969   Discharge Assessment   Assessment Type Discharge Planning Assessment   Confirmed/corrected address and phone number on facesheet? Yes   Assessment information obtained from? Patient;Medical Record   Expected Length of Stay (days) 2   Communicated expected length of stay with patient/caregiver yes   Prior to hospitilization cognitive status: Alert/Oriented   Prior to hospitalization functional status: Independent;Assistive Equipment   Current cognitive status: Alert/Oriented   Current Functional Status: Independent;Assistive Equipment   Facility Arrived From: Home   Lives With spouse   Able to Return to Prior Arrangements yes   Is patient able to care for self after discharge? Yes   Who are your caregiver(s) and their phone number(s)? Edie (wife) [phone]   Patient's perception of discharge disposition home health;home or selfcare   Readmission Within the Last 30 Days no previous admission in last 30 days   Patient currently being followed by outpatient case management? No   Patient currently receives any other outside agency services? No   Equipment Currently Used at Home bedside commode;cane, straight;walker, rolling;shower chair   Do you have any  problems affording any of your prescribed medications? No   Is the patient taking medications as prescribed? yes   Does the patient have transportation home? Yes   Transportation Anticipated family or friend will provide   Does the patient receive services at the Coumadin Clinic? No   Discharge Plan A Home with family;Home;Home Health   DME Needed Upon Discharge  none   Patient/Family in Agreement with Plan yes

## 2019-06-19 NOTE — PLAN OF CARE
Problem: Adult Inpatient Plan of Care  Goal: Plan of Care Review  Outcome: Revised  Chart review done.

## 2019-06-19 NOTE — PROGRESS NOTES
Referral to Home health [[id number]] (Order [id number])   Outpatient Referral   Date and Time: 6/19/2019  7:33 AM Department: [facility] Rel By/Authorizing: Kiko Garcia MD   Dept Order Information     Date Department   6/19/2019 [facility]   Order Information     Order Date/Time Release Date/Time Start Date/Time End Date/Time   06/19/19 07:33 AM None 6/19/2019 None   Order Details     Frequency Duration Priority Order Class   None None Routine External Referral   Quantity     Ordering Quantity   1   Quantity     Ordering Quantity Ordering Quantity   1 1   Order Details     Order ID   [id number]   Comments     Orthopedics [facility] HOME HEALTH ORDERS  FACE TO FACE ENCOUNTER    Patient Name: Ramirez Aguilar Jr.  Date of Birth:  1950    PCP: Fritz Levy Iii, MD   [facility] [place] [address]  PCP Phone Number: [phone]  PCP Fax: [phone]    Encounter Date: 06/19/2019    Admit to Home Health    Diagnoses:  Active Hospital Problems    *Right knee DJD [M17.11]     POA: Yes      Type 2 diabetes mellitus without complication, with long-term current use of insulin [E11.9, Z79.4]     POA: Not Applicable      Resolved Hospital Problems  No resolved problems to display.      [unfilled]  [unfilled]      I have seen and examined this patient face to face today. My clinical findings that support the need for the home health skilled services and home bound status are the following:  Weakness/numbness causing balance and gait disturbance due to Joint Replacement making it taxing to leave home.  Requiring assistive device to leave home due to unsteady gait caused by Joint Replacement.    Allergies:Review of patient's allergies indicates:  No Known Allergies    Diet: diabetic diet: 1800 calorie    Activities: ambulate in house    Home Health Admitting Clinician:   SN/PT to complete comprehensive assessment including  routine vital signs. Instruct on disease process and s/s of complications to report to MD. Follow specific home health arthoplasty protocol. Review/verify medication list sent home with the patient at time of discharge  and instruct patient/caregiver as needed. If coumadin ordered, coumadin clinic to manage INR with INR draws 2x per week with a goal to maintain INR between 1.8 and 2.2. Frequency may be adjusted depending on start of care date.    Notify MD if SBP > 160 or < 90; DBP > 90 or < 50; HR > 120 or < 50; Temp > 101    Home Medical Equipment:  Walker, 3-1 bedside commode, transfer tub bench    CONSULTS:    Physical Therapy may admit if patient not on coumadin, PT to perform comprehensive assessment if performing admit visit and generate therapy plan of care. Evaluate for home safety and equipment needs; Establish/upgrade home exercise program. Perform/instruct on therapeutic exercises, gait training, transfer training, and Range of Motion.  PT daily for 1 week, then 3x/week    WOUND CARE ORDERS:  Assess Surgical Incision Call MD if any drainage reaches border to border of drsg horizontally, s/s of infection, temp >101, induration, swelling or redness.  If dressing is removed per MD order, then apply island dressing, change/teach caregiver to perform daily dressing change if island dressing present.    Medications: Review discharge medications with patient and family and provide education.      Current Discharge Medication List    CONTINUE these medications which have NOT CHANGED    atorvastatin (LIPITOR) 20 MG tablet  Take 20 mg by mouth once daily.    diltiaZEM (CARDIZEM CD) 240 MG 24 hr capsule  Take 240 mg by mouth once daily.    glimepiride (AMARYL) 4 MG tablet  Take 4 mg by mouth before breakfast.    insulin (BASAGLAR KWIKPEN U-100 INSULIN) glargine 100 units/mL (3mL) SubQ pen  Inject 18 Units into the skin every evening.    irbesartan (AVAPRO) 150 MG tablet  Take 150 mg by mouth every  evening.    metformin (GLUCOPHAGE-XR) 750 MG 24 hr tablet  Take 750 mg by mouth daily with breakfast.    omeprazole (PRILOSEC) 20 MG capsule  Take 20 mg by mouth once daily.    apixaban (ELIQUIS) 5 mg Tab  Take 5 mg by mouth 2 (two) times daily.    hydroCHLOROthiazide (HYDRODIURIL) 25 MG tablet  Take 25 mg by mouth once daily.    sildenafil (VIAGRA) 100 MG tablet  Take 100 mg by mouth daily as needed for Erectile Dysfunction.          I certify that this patient is confined to his home and needs intermittent skilled nursing care and physical therapy.          Reprint Order Requisition     Referral to [facility] (Order #[id number]) on 19   Associated Diagnoses      ICD-10-CM ICD-9-CM   Primary osteoarthritis of right knee  - Primary     M17.11 715.16   Type 2 diabetes mellitus without complication, with long-term current use of insulin     E11.9  Z79.4 250.00  V58.67   Collection Information     Patient Details   MRN:[id number]   Name Gender Identity  SSN   Ramirez Harvey Jr. Male 1950       Address Phone Email Aliases   [address] [id number] Home: [phone]   Work:    Cell: [phone]     RAMIREZ HARVEY PAUL L      Inpatient Unit Inpatient Room Inpatient Bed    [facility] SUR[facility] UN[facility] ACUTE K530 K530 A       PCP Allergies     Fritz Levy III, MD No Known Allergies      Primary Visit Coverage     Payer Plan Sponsor Code Group Number Group Name   HUMANA MANAGED MEDICARE HUMANA MEDICARE HMO  [id number]    Primary Visit Coverage subscriber     Subscriber ID Subscriber Name Subscriber Address   [id number] RAMIREZ HARVEY JR. [address] [place] [address]   Additional Information     Associated Reports   Priority and Order Details   ADT-Related Order Information    Encounter     View Encounter             Order Provider Info         Office phone Pager E-mail   Ordering User Kiko Garcia [phone] -- [email or web address]   Authorizing Provider  Kiko Garcia [phone] -- --   Attending Provider Kiko Garcia [phone] -- --   Referral to Home health [[id number]]     Electronically signed by: Kiko Garcia MD on 06/19/19 0733 Status: Active   Ordering user: Kiko Garcia MD 06/19/19 0733 Ordering provider: Kiko Garcia MD   Authorized by: Kiko Garcia MD Ordering mode: Standard   Frequency:  06/19/19 -     Diagnoses  Primary osteoarthritis of right knee [M17.11]  Type 2 diabetes mellitus without complication, with long-term current use of insulin [E11.9, Z79.4]   Order comments: Orthopedics [facility] ORDERS FACE TO FACE ENCOUNTER Patient Name: Ramirez Aguilar Jr. Date of Birth:  1950 PCP: Fritz Levy Iii, MD PCP Address: [address] [place] [address] PCP Phone Number: [phone] PCP Fax: [phone] Encounter Date: 06/19/2019 Admit to Home Health Diagnoses: Active Hospital Problems   *Right knee DJD [M17.11]    POA: Yes   Type 2 diabetes mellitus without complication, with long-term current use of insulin [E11.9, Z79.4]    POA: Not Applicable Resolved Hospital Problems No resolved problems to display. [unfilled] [unfilled] I have seen and examined this patient face to face today. My clinical findings that support the need for the home health skilled services and home bound status are the following: Weakness/numbness causing balance and gait disturbance due to Joint Replacement making it taxing to leave home. Requiring assistive device to leave home due to unsteady gait caused by Joint Replacement. Allergies:Review of patient's allergies indicates: No Known Allergies Diet: diabetic diet: 1800 calorie Activities: ambulate in house Home Health Admitting Clinician: SN/PT to complete comprehensive assessment including routine vital signs. Instruct on disease process and s/s of complications to report to MD. Follow specific home health arthoplasty  protocol. Review/verify medication list sent home with the patient at time of discharge  and instruct patient/caregiver as needed. If coumadin ordered, coumadin clinic to manage INR with INR draws 2x per week with a goal to maintain INR between 1.8 and 2.2. Frequency may be adjusted depending on start of care date. Notify MD if SBP > 160 or < 90; DBP > 90 or < 50; HR > 120 or < 50; Temp > 101 Home Medical Equipment: Walker, 3-1 bedside commode, transfer tub bench CONSULTS:   Physical Therapy may admit if patient not on coumadin, PT to perform comprehensive assessment if performing admit visit and generate therapy plan of care. Evaluate for home safety and equipment needs; Establish/upgrade home exercise program. Perform/instruct on therapeutic exercises, gait training, transfer training, and Range of Motion. PT daily for 1 week, then 3x/week WOUND CARE ORDERS: Assess Surgical Incision Call MD if any drainage reaches border to border of drsg horizontally, s/s of infection, temp >101, induration, swelling or redness.  If dressing is removed per MD order, then apply island dressing, change/teach caregiver to perform daily dressing change if island dressing present. Medications: Review discharge medications with patient and family and provide education.   Current Discharge Medication List CONTINUE these medications which have NOT CHANGED atorvastatin (LIPITOR) 20 MG tablet Take 20 mg by mouth once daily. diltiaZEM (CARDIZEM CD) 240 MG 24 hr capsule Take 240 mg by mouth once daily. glimepiride (AMARYL) 4 MG tablet Take 4 mg by mouth before breakfast. insulin (BASAGLAR KWIKPEN U-100 INSULIN) glargine 100 units/mL (3mL) SubQ pen Inject 18 Units into the skin every evening. irbesartan (AVAPRO) 150 MG tablet Take 150 mg by mouth every evening. metformin (GLUCOPHAGE-XR) 750 MG 24 hr tablet Take 750 mg by mouth daily with breakfast. omeprazole (PRILOSEC) 20 MG capsule Take 20 mg by mouth once daily. apixaban (ELIQUIS) 5 mg Tab  Take 5 mg by mouth 2 (two) times daily. hydroCHLOROthiazide (HYDRODIURIL) 25 MG tablet Take 25 mg by mouth once daily. sildenafil (VIAGRA) 100 MG tablet Take 100 mg by mouth daily as needed for Erectile Dysfunction. I certify that this patient is confined to his home and needs intermittent skilled nursing care and physical therapy.   Order Diagnosis: Primary osteoarthritis of right knee [M17.11 (ICD-10-CM)]  Type 2 diabetes mellitus without complication, with long-term current use of insulin [E11.9, Z79.4 (ICD-10-CM)] CPT:                Electronically signed by: Kiko Garcia MD [id number] # [id number] NPI: [id number]

## 2019-06-19 NOTE — PLAN OF CARE
Problem: Adult Inpatient Plan of Care  Goal: Plan of Care Review  Outcome: Ongoing (interventions implemented as appropriate)  Pt AAOx4, VSS, NAD. No complaints of headache. No complaints of N/V. Complains of mild pain.Mild swelling in the right knee/leg. Pain controlled with PRN medications. Tolerating diet. Bed in lowest position. Call bell in reach. Safety maintained. Will continue to monitor.

## 2019-06-19 NOTE — PLAN OF CARE
VN reviewed discharge instructions with pt and wife.  AVS printed and handed to pt by bedside nurse.  Reviewed followup appts, medication, diet, and importance of medication compliance.  Reviewed home care instructions, treatment plan, self management and when to seek medical attention.  Allowed time for questions, all questions answered.  Pt verbalized complete understanding of discharge instructions and voices no concerns.      Discharge instructions complete.  Bedside delivery done.  Transport wheelchair requested.  Bedside nurse notified.

## 2019-06-19 NOTE — PT/OT/SLP PROGRESS
Occupational Therapy   Treatment/Discharge    Name: Ramirez Aguilar Jr.  MRN: [id number]  Admitting Diagnosis:  Right knee DJD  1 Day Post-Op    Recommendations:     Discharge Recommendations: home health PT, home health OT  Discharge Equipment Recommendations:  none  Barriers to discharge:  None    Assessment:     Ramirez Aguilar Jr. is a 69 y.o. male with a medical diagnosis of Right knee DJD.  He presents with performance deficits affecting function are weakness, impaired self care skills, impaired endurance, impaired functional mobilty, gait instability, decreased lower extremity function, pain, decreased ROM, impaired balance.     Rehab Prognosis:  Good; patient would benefit from acute skilled OT services to address these deficits and reach maximum level of function.       Plan:     Patient to be seen 5 x/week to address the above listed problems via self-care/home management, therapeutic activities, therapeutic exercises  · Plan of Care Expires: 07/18/19  · Plan of Care Reviewed with: patient    Subjective     Pain/Comfort:  · Pain Rating 1: 3/10  · Location - Side 1: Right  · Location - Orientation 1: generalized  · Location 1: thigh  · Pain Addressed 1: Pre-medicate for activity, Cessation of Activity, Reposition, Distraction  · Pain Rating Post-Intervention 1: 3/10    Objective:     Communicated with: nurse prior to session.  Patient found up in chair with peripheral IV upon OT entry to room.    General Precautions: Standard, fall   Orthopedic Precautions:RLE weight bearing as tolerated   Braces:       Occupational Performance:     Functional Mobility/Transfers:  · Patient completed Sit <> Stand Transfer with minimum assistance  with  rolling walker   · Functional Mobility: NT    Activities of Daily Living:  · Upper Body Dressing: modified independence and post setup    · Lower Body Dressing: stand by assistance and min A for sit to stand from low surface  · Toileting: stand by assistance urinal      [facility] 6  Click ADL: 19    Treatment & Education:  Pt educated on adaptive tech for dressing, bathing, IADLs    Patient left up in chair with all lines intact, call button in reach, chair alarm on and nurse notifiedEducation:      GOALS:   Multidisciplinary Problems     Occupational Therapy Goals        Problem: Occupational Therapy Goal    Goal Priority Disciplines Outcome Interventions   Occupational Therapy Goal     OT, PT/OT Unable to achieve outcome(s) by discharge    Description:  Goals to be met by: 7/18/19     Patient will increase functional independence with ADLs by performing:    LE Dressing with Modified [place].  Grooming while standing at sink with Modified [place].  Toileting from toilet with Modified [place] for hygiene and clothing management.   Toilet transfer to toilet with Modified [place].                      Time Tracking:     OT Date of Treatment: 06/19/19  OT Start Time: 0911  OT Stop Time: 0944  OT Total Time (min): 33 min    Billable Minutes:Self Care/Home Management 33    Ry Kang OT  6/19/2019

## 2019-06-19 NOTE — PT/OT/SLP PROGRESS
Physical Therapy Treatment    Patient Name:  Ramirez Aguilar Jr.   MRN:  [id number]    Recommendations:     Discharge Recommendations:  home health PT   Discharge Equipment Recommendations: none   Barriers to discharge: decreased mobility,endurance and ROM    Assessment:     Ramirez Aguilar Jr. is a 69 y.o. male admitted with a medical diagnosis of Right knee DJD.  He presents with the following impairments/functional limitations:  weakness, impaired endurance, impaired functional mobilty, gait instability, impaired balance, decreased lower extremity function, pain, decreased ROM, impaired coordination, impaired skin, orthopedic precautions,pt with improving endurance and requires ModA with sit-stand t/f from low surface,pt ready for discharge and will benefit from  services upon discharge.    Rehab Prognosis: Good; patient would benefit from acute skilled PT services to address these deficits and reach maximum level of function.    Recent Surgery: Procedure(s) (LRB):  ARTHROPLASTY, KNEE, TOTAL (Right) 1 Day Post-Op    Plan:     During this hospitalization, patient to be seen BID to address the identified rehab impairments via gait training, therapeutic activities, therapeutic exercises and progress toward the following goals:    · Plan of Care Expires:  07/18/19    Subjective     Chief Complaint: n/a  Patient/Family Comments/goals: pt is pleased with progress.  Pain/Comfort:  · Pain Rating 1: 4/10  · Location - Side 1: Right  · Location - Orientation 1: generalized  · Location 1: thigh  · Pain Addressed 1: Reposition, Distraction  · Pain Rating Post-Intervention 1: 4/10      Objective:     Communicated with nsg prior to session.  Patient found EOB with telemetry upon PT entry to room.     General Precautions: Standard, fall   Orthopedic Precautions:RLE weight bearing as tolerated   Braces: N/A     Functional Mobility:  · Transfers:     · Sit to Stand:  moderate assistance with rolling walker  · Toilet Transfer: contact  guard assistance and minimum assistance with  rolling walker  using  ambulation  · Gait: amb ~110' with RW and CGA  · Balance: fair standing balance with RW      AM-PAC 6 CLICK MOBILITY  Turning over in bed (including adjusting bedclothes, sheets and blankets)?: 3  Sitting down on and standing up from a chair with arms (e.g., wheelchair, bedside commode, etc.): 3  Moving from lying on back to sitting on the side of the bed?: 3  Moving to and from a bed to a chair (including a wheelchair)?: 3  Need to walk in hospital room?: 3  Climbing 3-5 steps with a railing?: 2  Basic Mobility Total Score: 17       Therapeutic Activities and Exercises: le seated ex's X 15 reps inc: laq,hip flex with Min A on R and self assisted R knee flexion X 10 second hold.       Patient left EOB with call button in reach, nsg notified and spouse present..    GOALS: see general POC  Multidisciplinary Problems     Physical Therapy Goals        Problem: Physical Therapy Goal    Goal Priority Disciplines Outcome Goal Variances Interventions   Physical Therapy Goal     PT, PT/OT Ongoing (interventions implemented as appropriate)     Description:  Goals to be met by: 19     Patient will increase functional independence with mobility by performin. Supine <> sit with supervision  MET 19  2. Sit to stand transfer with Supervision  3. Bed to chair transfer with Supervision using Rolling Walker  4. Gait  x 100 feet with Supervision using Rolling Walker.   5. Lower extremity exercise program x 10 reps per handout, with supervision                       Time Tracking:     PT Received On: 19  PT Start Time: 1247     PT Stop Time: 1315  PT Total Time (min): 28 min     Billable Minutes: Gait Training 16 and Therapeutic Exercise 12    Treatment Type: Treatment  PT/PTA: PTA     PTA Visit Number: 1     Casimiro Huitron, JALEN  2019

## 2019-06-19 NOTE — ANESTHESIA POST-OP PAIN MANAGEMENT
Acute Pain Service Progress Note    Ramirez Aguilar Jr. is a 69 y.o., male, [id number].    Surgery:  R TKA    Post Op Day #: 1    Catheter type: perineural  adductor canal    Infusion type: Ropivacaine 0.2%  8 ml/hr basal    Problem List:    Active Hospital Problems    Diagnosis  POA    *Right knee DJD [M17.11]  Yes    Type 2 diabetes mellitus without complication, with long-term current use of insulin [E11.9, Z79.4]  Not Applicable      Resolved Hospital Problems   No resolved problems to display.       Subjective:     General appearance of alert, oriented, no complaints   Pain with rest: 4    Numbers   Pain with movement: 4    Numbers   Side Effects    1. Pruritis No    2. Nausea No    3. Motor Blockade No, 0=Ability to raise lower extremities off bed    4. Sedation No, 1=awake and alert    Objective:        Catheter site clean, dry, intact        Vitals   Vitals:    06/19/19 1120   BP:    Pulse: 104   Resp: 18   Temp:         Labs    Admission on 06/18/2019   Component Date Value Ref Range Status    POCT Glucose 06/18/2019 139* 70 - 110 mg/dL Final    POCT Glucose 06/18/2019 262* 70 - 110 mg/dL Final    POCT Glucose 06/18/2019 250* 70 - 110 mg/dL Final    POCT Glucose 06/18/2019 240* 70 - 110 mg/dL Final    POCT Glucose 06/18/2019 274* 70 - 110 mg/dL Final    Hematocrit 06/19/2019 36.5* 40.0 - 54.0 % Final    POCT Glucose 06/19/2019 233* 70 - 110 mg/dL Final    POCT Glucose 06/19/2019 273* 70 - 110 mg/dL Final        Meds   Current Facility-Administered Medications   Medication Dose Route Frequency Provider Last Rate Last Dose    acetaminophen tablet 650 mg  650 mg Oral Q4H PRN Kiko Garcia MD        apixaban tablet 5 mg  5 mg Oral BID Kiko Garcia MD   5 mg at 06/19/19 0903    atorvastatin tablet 20 mg  20 mg Oral Daily Kiko Garcia MD   20 mg at 06/18/19 1303    dextrose 10% (D10W) Bolus  12.5 g Intravenous PRN Kiko Garcia MD        dextrose 10% (D10W) Bolus  25 g  Intravenous PRN Kiko Garcia MD        diltiaZEM 24 hr capsule 240 mg  240 mg Oral Daily Kiko Garcia MD   240 mg at 06/19/19 0904    docusate sodium capsule 100 mg  100 mg Oral Q12H Kiko Garcia MD   100 mg at 06/19/19 0903    glimepiride tablet 4 mg  4 mg Oral Before breakfast Kiko Garcia MD   4 mg at 06/19/19 0552    glucagon (human recombinant) injection 1 mg  1 mg Intramuscular PRN Kiko Garcia MD        glucose chewable tablet 16 g  16 g Oral PRN Kiko Garcia MD        glucose chewable tablet 24 g  24 g Oral PRN Kiko Garcia MD        hydroCHLOROthiazide tablet 25 mg  25 mg Oral Daily Kiko Garcia MD   25 mg at 06/19/19 0903    hydromorphone (PF) injection 0.5 mg  0.5 mg Intravenous Q2H PRN Kiko Garcia MD        insulin aspart U-100 pen 1-10 Units  1-10 Units Subcutaneous QID (AC + HS) PRN Kiko Garcia MD   4 Units at 06/19/19 1233    insulin detemir U-100 pen 15 Units  15 Units Subcutaneous Daily Kiko Garcia MD   15 Units at 06/19/19 0907    losartan tablet 25 mg  25 mg Oral Daily Kiko Garcia MD   25 mg at 06/19/19 0904    metFORMIN 24 hr tablet 750 mg  750 mg Oral Daily with breakfast Kiko Garcia MD   750 mg at 06/19/19 0903    mupirocin 2 % ointment 1 g  1 g Nasal BID Kiko Garcia MD   1 g at 06/19/19 0906    ondansetron injection 4 mg  4 mg Intravenous Q8H PRN Kiko Garcia MD        oxyCODONE immediate release tablet 5 mg  5 mg Oral Q4H PRN Kiko Garcia MD   5 mg at 06/19/19 1349    pantoprazole EC tablet 40 mg  40 mg Oral Before breakfast Kiko Garcia MD   40 mg at 06/19/19 0552    pregabalin capsule 75 mg  75 mg Oral BID Kiko Garcia MD   75 mg at 06/19/19 0903    promethazine (PHENERGAN) 6.25 mg in dextrose 5 % 50 mL IVPB  6.25 mg Intravenous Q6H PRN Kiko Garcia MD        ropivacaine (NAROPIN) 0.2% ON-Q C-BLOC 550 mL (med + pump) (SAF)   Perineural  Continuous Kiko Garcia MD        sodium chloride 0.9% flush 10 mL  10 mL Intravenous PRN Kiko Garcia MD        zolpidem tablet 5 mg  5 mg Oral Nightly PRN Kiko Garcia MD   5 mg at 06/18/19 3934            Assessment:     Pain control adequate    Plan:     Patient doing well, continue present treatment.    Evaluator Parviz Zapata

## 2019-06-19 NOTE — PLAN OF CARE
Problem: Occupational Therapy Goal  Goal: Occupational Therapy Goal  Goals to be met by: 7/18/19     Patient will increase functional independence with ADLs by performing:    LE Dressing with Modified [place].  Grooming while standing at sink with Modified [place].  Toileting from toilet with Modified [place] for hygiene and clothing management.   Toilet transfer to toilet with Modified [place].     Outcome: Unable to achieve outcome(s) by discharge  Performed ADLs w/SBA    Home today w/HH therapy

## 2019-06-19 NOTE — PLAN OF CARE
Problem: Physical Therapy Goal  Goal: Physical Therapy Goal  Goals to be met by: 19     Patient will increase functional independence with mobility by performin. Supine <> sit with supervision  MET 19  2. Sit to stand transfer with Supervision  3. Bed to chair transfer with Supervision using Rolling Walker  4. Gait  x 100 feet with Supervision using Rolling Walker.   5. Lower extremity exercise program x 10 reps per handout, with supervision     Outcome: Ongoing (interventions implemented as appropriate)  Goal 1 met

## 2019-06-19 NOTE — PROGRESS NOTES
[facility]  Orthopedics  Progress Note    Patient Name: Ramirez Aguilar Jr.  MRN: [id number]  Admission Date: 6/18/2019  Hospital Length of Stay: 0 days  Attending Provider: Kiko Garcia MD  Primary Care Provider: Fritz Levy Iii, MD  Follow-up For: Procedure(s) (LRB):  ARTHROPLASTY, KNEE, TOTAL (Right)    Post-Operative Day: 1 Day Post-Op  Subjective:     Principal Problem:Right knee DJD    Principal Orthopedic Problem: Post op RT TKA    Interval History: PAin controlled, tolerating PT    Review of patient's allergies indicates:  No Known Allergies    Current Facility-Administered Medications   Medication    acetaminophen tablet 650 mg    apixaban tablet 5 mg    atorvastatin tablet 20 mg    dextrose 10% (D10W) Bolus    dextrose 10% (D10W) Bolus    diltiaZEM 24 hr capsule 240 mg    docusate sodium capsule 100 mg    glimepiride tablet 4 mg    glucagon (human recombinant) injection 1 mg    glucose chewable tablet 16 g    glucose chewable tablet 24 g    hydroCHLOROthiazide tablet 25 mg    hydromorphone (PF) injection 0.5 mg    insulin aspart U-100 pen 1-10 Units    insulin detemir U-100 pen 15 Units    losartan tablet 25 mg    metFORMIN 24 hr tablet 750 mg    mupirocin 2 % ointment 1 g    ondansetron injection 4 mg    oxyCODONE immediate release tablet 5 mg    pantoprazole EC tablet 40 mg    pregabalin capsule 75 mg    promethazine (PHENERGAN) 6.25 mg in dextrose 5 % 50 mL IVPB    ropivacaine (NAROPIN) 0.2% ON-Q C-BLOC 550 mL (med + pump) (SAF)    sodium chloride 0.9% flush 10 mL    zolpidem tablet 5 mg     Objective:     Vital Signs (Most Recent):  Temp: 98.1 °F (36.7 °C) (06/19/19 0542)  Pulse: 94 (06/19/19 0542)  Resp: 19 (06/19/19 0542)  BP: (!) 152/99 (06/19/19 0542)  SpO2: 100 % (06/19/19 0412) Vital Signs (24h Range):  Temp:  [96.6 °F (35.9 °C)-98.1 °F (36.7 °C)] 98.1 °F (36.7 °C)  Pulse:  [] 94  Resp:  [12-20] 19  SpO2:  [99 %-100 %] 100 %  BP:  (104-164)/() 152/99     Weight: 114.6 kg (252 lb 10.4 oz)  Height: 6' (182.9 cm)  Body mass index is 34.27 kg/m².      Intake/Output Summary (Last 24 hours) at 6/19/2019 0721  Last data filed at 6/19/2019 0600  Gross per 24 hour   Intake 2872.5 ml   Output 4500 ml   Net -1627.5 ml       Ortho/SPM Exam   Incision intact, no drainage.  Homans neg.  NVI.  Heart-rrr, Lungs clear    Significant Labs:   CBC: No results for input(s): WBC, HGB, HCT, PLT in the last 48 hours.  POCT Glucose:   Recent Labs   Lab 06/18/19  1655 06/18/19  1952 06/19/19  0532   POCTGLUCOSE 240* 274* 233*     All pertinent labs within the past 24 hours have been reviewed.    Significant Imaging: X-Ray: I have reviewed all pertinent results/findings and my personal findings are:  Post op knee films show TKA stable  I have reviewed and interpreted all pertinent imaging results/findings.    Assessment/Plan:     Active Diagnoses:    Diagnosis Date Noted POA    PRINCIPAL PROBLEM:  Right knee DJD [M17.11] 06/18/2019 Yes    Type 2 diabetes mellitus without complication, with long-term current use of insulin [E11.9, Z79.4] 06/19/2019 Not Applicable      Problems Resolved During this Admission:   Post op RT TKA-stable    Plan-m cont OOB, PT, BS control  Discharge home later today        Kiko Garcia MD  Orthopedics  [facility]

## 2019-06-22 ENCOUNTER — NURSE TRIAGE (OUTPATIENT)
Dept: ADMINISTRATIVE | Facility: CLINIC | Age: 69
End: 2019-06-22

## 2019-06-22 NOTE — TELEPHONE ENCOUNTER
Reason for Disposition   [1] Blood glucose > 240 mg/dl (13 mmol/l) AND [2] uses insulin (e.g., insulin-dependent, all people with type 1 diabetes)    Protocols used: DIABETES - HIGH BLOOD SUGAR-A-    Pt was concerned because his blood sugar has been fluctuating since having his knee replacement, advised him that steroids take a while to clear the system so they could be keeping his blood sugar high at the moment, he states it was most recently 267, denies any symptoms, advised him to increase his water intake and to call back with any worsening symptoms, caller agrees

## 2019-06-23 NOTE — DISCHARGE SUMMARY
[facility]  General Surgery  Discharge Summary      Patient Name: Ramirez Aguilar Jr.  MRN: [id number]  Admission Date: 6/18/2019  Hospital Length of Stay: 0 days  Discharge Date and Time: 6/19/2019  4:14 PM  Attending Physician: No att. providers found   Discharging Provider: Kiko Garcia MD  Primary Care Provider: Fritz Levy Iii, MD     HPI: 68 yo admitted with djd knee for rt TKA    Procedure(s) (LRB):  ARTHROPLASTY, KNEE, TOTAL (Right)     Hospital Course: After surgery, admitted for pt,ot ans pain control.  Did very well, tolerating diet, voiding and had a BM.  At discharge, stable with PT and tolerating diet    Consults: PT, OT    Significant Diagnostic Studies: Labs: All labs within the past 24 hours have been reviewed  Radiology: X-Ray: Post op knee films show TKA stable    Pending Diagnostic Studies:     None        Final Active Diagnoses:    Diagnosis Date Noted POA    PRINCIPAL PROBLEM:  Right knee DJD [M17.11] 06/18/2019 Yes    Type 2 diabetes mellitus without complication, with long-term current use of insulin [E11.9, Z79.4] 06/19/2019 Not Applicable      Problems Resolved During this Admission:      Discharged Condition: stable    Disposition: Home or Self Care    Follow Up:  Follow-up Information     Kiko Garcia MD On 7/3/2019.    Specialty:  Orthopedic Surgery  Why:  Appt at 930am  Contact information:  [address]  [place] [address]  [place] [address] [phone]             [facility] [place].    Specialty:  Home Health Services  Contact information:  [address]  [address]  [place] [id number] [phone]                 Patient Instructions:      Referral to Home health   Referral Priority: Routine Referral Type: Home Health   Referral Reason: Specialty Services Required   Referred to Provider: [facility] Requested Specialty: Home Health Services   Number of Visits Requested: 1     Diet general     Order Specific Question  Answer Comments   Total calories: 1800 Calorie      Keep surgical extremity elevated     Ice to affected area     No driving, operating heavy equipment or signing legal documents while taking pain medication     Call MD for:  extreme fatigue     Call MD for:  persistent dizziness or light-headedness     Call MD for:  hives     Call MD for:  redness, tenderness, or signs of infection (pain, swelling, redness, odor or green/yellow discharge around incision site)     Call MD for:  difficulty breathing, headache or visual disturbances     Call MD for:  severe uncontrolled pain     Call MD for:  persistent nausea and vomiting     Call MD for:  temperature >100.4     No dressing needed     Shower on day dressing removed (No bath)   Scheduling Instructions: May shower today     Weight bearing as tolerated     Medications:  Reconciled Home Medications:      Medication List      START taking these medications    docusate sodium 100 MG capsule  Commonly known as:  COLACE  Take 1 capsule (100 mg total) by mouth every 12 (twelve) hours.     oxyCODONE-acetaminophen 5-325 mg per tablet  Commonly known as:  PERCOCET  Take 1 tablet by mouth every 4 (four) hours as needed for Pain.     * promethazine 25 MG tablet  Commonly known as:  PHENERGAN  Take 0.5 tablets (12.5 mg total) by mouth every 6 (six) hours as needed for Nausea.         * This list has 1 medication(s) that are the same as other medications prescribed for you. Read the directions carefully, and ask your doctor or other care provider to review them with you.            CONTINUE taking these medications    atorvastatin 20 MG tablet  Commonly known as:  LIPITOR  Take 20 mg by mouth once daily.     BASAGLAR KWIKPEN U-100 INSULIN glargine 100 units/mL (3mL) SubQ pen  Generic drug:  insulin  Inject 18 Units into the skin every evening.     diltiaZEM 240 MG 24 hr capsule  Commonly known as:  CARDIZEM CD  Take 240 mg by mouth once daily.     ELIQUIS 5 mg Tab  Generic drug:   apixaban  Take 5 mg by mouth 2 (two) times daily.     glimepiride 4 MG tablet  Commonly known as:  AMARYL  Take 4 mg by mouth before breakfast.     hydroCHLOROthiazide 25 MG tablet  Commonly known as:  HYDRODIURIL  Take 25 mg by mouth once daily.     irbesartan 150 MG tablet  Commonly known as:  AVAPRO  Take 150 mg by mouth every evening.     metFORMIN 750 MG 24 hr tablet  Commonly known as:  GLUCOPHAGE-XR  Take 750 mg by mouth daily with breakfast.     omeprazole 20 MG capsule  Commonly known as:  PRILOSEC  Take 20 mg by mouth once daily.     sildenafil 100 MG tablet  Commonly known as:  VIAGRA  Take 100 mg by mouth daily as needed for Erectile Dysfunction.        ASK your doctor about these medications    * promethazine 12.5 MG Tab  Commonly known as:  PHENERGAN  TAKE 1 TABLET BY MOUTH EVERY 6 HOURS AS NEEDED FOR NAUSEA AND VOMITING         * This list has 1 medication(s) that are the same as other medications prescribed for you. Read the directions carefully, and ask your doctor or other care provider to review them with you.                Kiko Garcia MD  General Surgery  [facility]

## 2019-07-16 ENCOUNTER — HOSPITAL ENCOUNTER (EMERGENCY)
Facility: HOSPITAL | Age: 69
Discharge: HOME | End: 2019-07-16
Attending: EMERGENCY MEDICINE
Payer: MEDICARE

## 2019-07-16 VITALS
HEIGHT: 72 IN | OXYGEN SATURATION: 98 % | BODY MASS INDEX: 32.78 KG/M2 | WEIGHT: 242 LBS | SYSTOLIC BLOOD PRESSURE: 166 MMHG | HEART RATE: 91 BPM | DIASTOLIC BLOOD PRESSURE: 96 MMHG | TEMPERATURE: 99 F | RESPIRATION RATE: 16 BRPM

## 2019-07-16 DIAGNOSIS — R00.2 PALPITATIONS: ICD-10-CM

## 2019-07-16 LAB
ALBUMIN SERPL BCP-MCNC: 3.8 G/DL (ref 3.5–5.2)
ALP SERPL-CCNC: 96 U/L (ref 38–126)
ALT SERPL W/O P-5'-P-CCNC: 20 U/L (ref 10–44)
ANION GAP SERPL CALC-SCNC: 9 MMOL/L (ref 8–16)
AST SERPL-CCNC: 24 U/L (ref 15–46)
BASOPHILS # BLD AUTO: 0.03 K/UL (ref 0–0.2)
BASOPHILS NFR BLD: 0.6 % (ref 0–1.9)
BILIRUB SERPL-MCNC: 0.8 MG/DL (ref 0.1–1)
BUN SERPL-MCNC: 8 MG/DL (ref 2–20)
CALCIUM SERPL-MCNC: 9.1 MG/DL (ref 8.7–10.5)
CHLORIDE SERPL-SCNC: 96 MMOL/L (ref 95–110)
CO2 SERPL-SCNC: 28 MMOL/L (ref 23–29)
CREAT SERPL-MCNC: 0.71 MG/DL (ref 0.5–1.4)
DIFFERENTIAL METHOD: ABNORMAL
EOSINOPHIL # BLD AUTO: 0.1 K/UL (ref 0–0.5)
EOSINOPHIL NFR BLD: 2 % (ref 0–8)
ERYTHROCYTE [DISTWIDTH] IN BLOOD BY AUTOMATED COUNT: 14.3 % (ref 11.5–14.5)
EST. GFR  (AFRICAN AMERICAN): >60 ML/MIN/1.73 M^2
EST. GFR  (NON AFRICAN AMERICAN): >60 ML/MIN/1.73 M^2
GLUCOSE SERPL-MCNC: 220 MG/DL (ref 70–110)
HCT VFR BLD AUTO: 33.8 % (ref 40–54)
HGB BLD-MCNC: 10.8 G/DL (ref 14–18)
LYMPHOCYTES # BLD AUTO: 0.9 K/UL (ref 1–4.8)
LYMPHOCYTES NFR BLD: 18.6 % (ref 18–48)
MCH RBC QN AUTO: 29.2 PG (ref 27–31)
MCHC RBC AUTO-ENTMCNC: 32 G/DL (ref 32–36)
MCV RBC AUTO: 91 FL (ref 82–98)
MONOCYTES # BLD AUTO: 0.5 K/UL (ref 0.3–1)
MONOCYTES NFR BLD: 9.9 % (ref 4–15)
NEUTROPHILS # BLD AUTO: 3.5 K/UL (ref 1.8–7.7)
NEUTROPHILS NFR BLD: 68.9 % (ref 38–73)
NT-PROBNP: 309 PG/ML (ref 5–900)
PLATELET # BLD AUTO: 246 K/UL (ref 150–350)
PMV BLD AUTO: 10.1 FL (ref 9.2–12.9)
POTASSIUM SERPL-SCNC: 4.5 MMOL/L (ref 3.5–5.1)
PROT SERPL-MCNC: 6.6 G/DL (ref 6–8.4)
RBC # BLD AUTO: 3.7 M/UL (ref 4.6–6.2)
SODIUM SERPL-SCNC: 133 MMOL/L (ref 136–145)
TROPONIN I SERPL DL<=0.01 NG/ML-MCNC: <0.012 NG/ML (ref 0.01–0.03)
TROPONIN I SERPL DL<=0.01 NG/ML-MCNC: <0.012 NG/ML (ref 0.01–0.03)
WBC # BLD AUTO: 5.06 K/UL (ref 3.9–12.7)

## 2019-07-16 PROCEDURE — 93005 ELECTROCARDIOGRAM TRACING: CPT | Mod: ER

## 2019-07-16 PROCEDURE — 99285 EMERGENCY DEPT VISIT HI MDM: CPT | Mod: ER,25

## 2019-07-16 PROCEDURE — 80053 COMPREHEN METABOLIC PANEL: CPT | Mod: ER

## 2019-07-16 PROCEDURE — 94761 N-INVAS EAR/PLS OXIMETRY MLT: CPT | Mod: ER

## 2019-07-16 PROCEDURE — 94760 N-INVAS EAR/PLS OXIMETRY 1: CPT | Mod: ER

## 2019-07-16 PROCEDURE — 93010 ELECTROCARDIOGRAM REPORT: CPT | Mod: ,,, | Performed by: INTERNAL MEDICINE

## 2019-07-16 PROCEDURE — 93010 EKG 12-LEAD: ICD-10-PCS | Mod: ,,, | Performed by: INTERNAL MEDICINE

## 2019-07-16 PROCEDURE — 83880 ASSAY OF NATRIURETIC PEPTIDE: CPT | Mod: ER

## 2019-07-16 PROCEDURE — 84484 ASSAY OF TROPONIN QUANT: CPT | Mod: 91,ER

## 2019-07-16 PROCEDURE — 25000003 PHARM REV CODE 250: Mod: ER | Performed by: EMERGENCY MEDICINE

## 2019-07-16 PROCEDURE — 85025 COMPLETE CBC W/AUTO DIFF WBC: CPT | Mod: ER

## 2019-07-16 RX ORDER — TRAMADOL HYDROCHLORIDE 50 MG/1
50 TABLET ORAL EVERY 6 HOURS PRN
COMMUNITY

## 2019-07-16 RX ORDER — ASPIRIN 325 MG
325 TABLET ORAL
Status: COMPLETED | OUTPATIENT
Start: 2019-07-16 | End: 2019-07-16

## 2019-07-16 RX ADMIN — ASPIRIN 325 MG ORAL TABLET 325 MG: 325 PILL ORAL at 07:07

## 2019-07-17 NOTE — ED PROVIDER NOTES
Encounter Date: 7/16/2019       History     Chief Complaint   Patient presents with    Palpitations     Pt states has been having palpitations and SOB after performing exercises over past two days.  Pt states had right total knee replacement 4 weeks ago.  Pt states had discomfort to right side of chest with SOB after performing exercises for knee.       Patient underwent a right total knee arthroscopy plasty x4 weeks.  He is currently undergoing rehab for his knee.  Today at the rehab center he started having some palpitations and very mild intermittent chest pain. He is complaining of some pain in his knee but he states that that is due to increased activity.  Is also states that there is some swelling to his right lower extremity.  Patient has a history of atrial fibrillation and is on Eliquis    The history is provided by the patient.   Palpitations    This is a recurrent problem. The current episode started today. The problem occurs intermittently. The problem has been gradually improving. The problem is associated with exercise. Associated symptoms include malaise/fatigue, chest pain, irregular heartbeat, leg pain and lower extremity edema. Pertinent negatives include no diaphoresis, no chest pressure, no claudication, no near-syncope, no orthopnea, no PND, no syncope, no abdominal pain, no dizziness, no weakness, no cough, no hemoptysis and no shortness of breath. He has tried nothing for the symptoms. Risk factors include smoking/tobacco exposure, dyslipidemia, being male, a sedentary lifestyle, obesity and diabetes mellitus.     Review of patient's allergies indicates:  No Known Allergies  Past Medical History:   Diagnosis Date    A-fib     Arthritis     Diabetes mellitus     Hyperlipidemia     Hypertension     KISHAN on CPAP      Past Surgical History:   Procedure Laterality Date    ARTHROPLASTY, KNEE, TOTAL Right 6/18/2019    [facility]formed [facility]    COLONOSCOPY  2007     COLONOSCOPY/ Split dose N/A 9/18/2017    Per[facility] Subhash Cordova Jr., [facility] at [facility] ENDO    KNEE ARTHROSCOPY Right     UPPER GASTROINTESTINAL ENDOSCOPY  2007     Family History   Problem Relation Age of Onset    Heart disease Mother      Social History     Tobacco Use    Smoking status: Never Smoker    Smokeless tobacco: Never Used   Substance Use Topics    Alcohol use: Yes     Comment: occasionall    Drug use: No     Review of Systems   Constitutional: Positive for malaise/fatigue. Negative for diaphoresis.   Respiratory: Negative for cough, hemoptysis and shortness of breath.    Cardiovascular: Positive for chest pain, palpitations and leg swelling. Negative for orthopnea, claudication, syncope, PND and near-syncope.   Gastrointestinal: Negative for abdominal pain.   Neurological: Negative for dizziness and weakness.   All other systems reviewed and are negative.      Physical Exam     Initial Vitals [07/16/19 1842]   BP Pulse Resp Temp SpO2   (!) 162/92 101 20 98.5 °F (36.9 °C) 100 %      MAP       --         Physical Exam    Nursing note and vitals reviewed.  Constitutional: He appears well-developed and well-nourished.   HENT:   Head: Normocephalic and atraumatic.   Eyes: Conjunctivae and EOM are normal.   Neck: Normal range of motion. Neck supple.   Cardiovascular: Normal rate and normal heart sounds. An irregularly irregular rhythm present.    Pulmonary/Chest: Breath sounds normal. No respiratory distress. He has no wheezes. He has no rhonchi. He has no rales.   Abdominal: Soft. He exhibits no distension. There is no tenderness. There is no rebound and no guarding.   Musculoskeletal: Normal range of motion.        Legs:  Neurological: He is alert and oriented to person, place, and time. GCS score is 15. GCS eye subscore is 4. GCS verbal subscore is 5. GCS motor subscore is 6.   Skin: Skin is warm and dry. Capillary refill takes less than 2 seconds.   Psychiatric: He has a normal mood and affect.  His behavior is normal. Judgment normal.         ED Course   Procedures  Labs Reviewed   CBC W/ AUTO DIFFERENTIAL   COMPREHENSIVE METABOLIC PANEL   TROPONIN I   TROPONIN I   NT-PRO NATRIURETIC PEPTIDE          Imaging Results    None       X-Rays:   Independently Interpreted Readings:   Chest X-Ray: Normal heart size.  No infiltrates.  No acute abnormalities.     Medical Decision Making:   Clinical Tests:   Lab Tests: Ordered and Reviewed  Radiological Study: Ordered and Reviewed  Medical Tests: Ordered and Reviewed                      Clinical Impression:       ICD-10-CM ICD-9-CM   1. Palpitations R00.2 785.1         Disposition:   Disposition: Discharged  Condition: Stable                        Isela Mcneill MD  07/17/19 5900

## 2022-04-03 ENCOUNTER — HOSPITAL ENCOUNTER (EMERGENCY)
Facility: HOSPITAL | Age: 72
Discharge: HOME | End: 2022-04-04
Attending: EMERGENCY MEDICINE
Payer: MEDICARE

## 2022-04-03 DIAGNOSIS — R10.9 ABDOMINAL PAIN, UNSPECIFIED ABDOMINAL LOCATION: ICD-10-CM

## 2022-04-03 DIAGNOSIS — R10.31 RIGHT LOWER QUADRANT ABDOMINAL PAIN: Primary | ICD-10-CM

## 2022-04-03 LAB
ALBUMIN SERPL BCP-MCNC: 4.8 G/DL (ref 3.5–5.2)
ALP SERPL-CCNC: 80 U/L (ref 38–126)
ALT SERPL W/O P-5'-P-CCNC: 32 U/L (ref 10–44)
ANION GAP SERPL CALC-SCNC: 11 MMOL/L (ref 8–16)
AST SERPL-CCNC: 33 U/L (ref 15–46)
BACTERIA #/AREA URNS AUTO: NORMAL /HPF
BASOPHILS # BLD AUTO: 0.05 K/UL (ref 0–0.2)
BASOPHILS NFR BLD: 0.8 % (ref 0–1.9)
BILIRUB SERPL-MCNC: 0.6 MG/DL (ref 0.1–1)
BILIRUB UR QL STRIP: NEGATIVE
CALCIUM SERPL-MCNC: 9.2 MG/DL (ref 8.7–10.5)
CHLORIDE SERPL-SCNC: 94 MMOL/L (ref 95–110)
CLARITY UR REFRACT.AUTO: CLEAR
CO2 SERPL-SCNC: 29 MMOL/L (ref 23–29)
COLOR UR AUTO: ABNORMAL
CREAT SERPL-MCNC: 0.69 MG/DL (ref 0.5–1.4)
DIFFERENTIAL METHOD: ABNORMAL
EOSINOPHIL # BLD AUTO: 0.2 K/UL (ref 0–0.5)
EOSINOPHIL NFR BLD: 2.3 % (ref 0–8)
ERYTHROCYTE [DISTWIDTH] IN BLOOD BY AUTOMATED COUNT: 14.4 % (ref 11.5–14.5)
EST. GFR  (AFRICAN AMERICAN): >60 ML/MIN/1.73 M^2
EST. GFR  (NON AFRICAN AMERICAN): >60 ML/MIN/1.73 M^2
GLUCOSE SERPL-MCNC: 282 MG/DL (ref 70–110)
GLUCOSE UR QL STRIP: ABNORMAL
HCT VFR BLD AUTO: 40.2 % (ref 40–54)
HGB BLD-MCNC: 13.2 G/DL (ref 14–18)
HGB UR QL STRIP: NEGATIVE
IMM GRANULOCYTES # BLD AUTO: 0.03 K/UL (ref 0–0.04)
IMM GRANULOCYTES NFR BLD AUTO: 0.5 % (ref 0–0.5)
KETONES UR QL STRIP: NEGATIVE
LEUKOCYTE ESTERASE UR QL STRIP: NEGATIVE
LIPASE SERPL-CCNC: 109 U/L (ref 23–300)
LYMPHOCYTES # BLD AUTO: 0.5 K/UL (ref 1–4.8)
LYMPHOCYTES NFR BLD: 7.9 % (ref 18–48)
MCH RBC QN AUTO: 27.8 PG (ref 27–31)
MCHC RBC AUTO-ENTMCNC: 32.8 G/DL (ref 32–36)
MCV RBC AUTO: 85 FL (ref 82–98)
MICROSCOPIC COMMENT: NORMAL
MONOCYTES # BLD AUTO: 0.6 K/UL (ref 0.3–1)
MONOCYTES NFR BLD: 9 % (ref 4–15)
NEUTROPHILS # BLD AUTO: 5.1 K/UL (ref 1.8–7.7)
NEUTROPHILS NFR BLD: 79.5 % (ref 38–73)
NITRITE UR QL STRIP: NEGATIVE
NRBC BLD-RTO: 0 /100 WBC
PH UR STRIP: 6 [PH] (ref 5–8)
PLATELET # BLD AUTO: 206 K/UL (ref 150–450)
PMV BLD AUTO: 11.2 FL (ref 9.2–12.9)
POTASSIUM SERPL-SCNC: 4.4 MMOL/L (ref 3.5–5.1)
PROT SERPL-MCNC: 7.9 G/DL (ref 6–8.4)
PROT UR QL STRIP: NEGATIVE
RBC # BLD AUTO: 4.75 M/UL (ref 4.6–6.2)
SODIUM SERPL-SCNC: 134 MMOL/L (ref 136–145)
SP GR UR STRIP: 1.02 (ref 1–1.03)
URN SPEC COLLECT METH UR: ABNORMAL
UROBILINOGEN UR STRIP-ACNC: NEGATIVE EU/DL
UUN UR-MCNC: 13 MG/DL (ref 2–20)
WBC # BLD AUTO: 6.44 K/UL (ref 3.9–12.7)
YEAST UR QL AUTO: NORMAL

## 2022-04-03 PROCEDURE — 99284 EMERGENCY DEPT VISIT MOD MDM: CPT | Mod: 25,ER

## 2022-04-03 PROCEDURE — 83690 ASSAY OF LIPASE: CPT | Mod: ER | Performed by: EMERGENCY MEDICINE

## 2022-04-03 PROCEDURE — 85025 COMPLETE CBC W/AUTO DIFF WBC: CPT | Mod: ER | Performed by: EMERGENCY MEDICINE

## 2022-04-03 PROCEDURE — 80053 COMPREHEN METABOLIC PANEL: CPT | Mod: ER | Performed by: EMERGENCY MEDICINE

## 2022-04-03 PROCEDURE — 81000 URINALYSIS NONAUTO W/SCOPE: CPT | Mod: ER | Performed by: EMERGENCY MEDICINE

## 2022-04-03 PROCEDURE — 96374 THER/PROPH/DIAG INJ IV PUSH: CPT | Mod: ER

## 2022-04-04 VITALS
WEIGHT: 260 LBS | BODY MASS INDEX: 35.21 KG/M2 | HEIGHT: 72 IN | RESPIRATION RATE: 18 BRPM | TEMPERATURE: 97 F | DIASTOLIC BLOOD PRESSURE: 82 MMHG | SYSTOLIC BLOOD PRESSURE: 133 MMHG | HEART RATE: 84 BPM | OXYGEN SATURATION: 99 %

## 2022-04-04 PROCEDURE — 63600175 PHARM REV CODE 636 W HCPCS: Mod: ER | Performed by: EMERGENCY MEDICINE

## 2022-04-04 RX ORDER — KETOROLAC TROMETHAMINE 10 MG/1
10 TABLET, FILM COATED ORAL EVERY 6 HOURS
Qty: 20 TABLET | Refills: 0 | Status: SHIPPED | OUTPATIENT
Start: 2022-04-04 | End: 2022-04-09

## 2022-04-04 RX ORDER — MORPHINE SULFATE 4 MG/ML
2 INJECTION, SOLUTION INTRAMUSCULAR; INTRAVENOUS
Status: COMPLETED | OUTPATIENT
Start: 2022-04-04 | End: 2022-04-04

## 2022-04-04 RX ORDER — DOCUSATE SODIUM 100 MG/1
100 CAPSULE, LIQUID FILLED ORAL 2 TIMES DAILY
Qty: 60 CAPSULE | Refills: 0 | Status: SHIPPED | OUTPATIENT
Start: 2022-04-04

## 2022-04-04 RX ADMIN — MORPHINE SULFATE 2 MG: 4 INJECTION INTRAVENOUS at 01:04

## 2022-04-04 NOTE — ED PROVIDER NOTES
Encounter Date: 4/3/2022       History     Chief Complaint   Patient presents with    Abdominal Pain     Reports RUQ pain since this morning. States worse after eating. Denies NVD. Last BM today, took laxative.      /11:00 a.m. this morning this patient has been experiencing abdominal pain localized to the right lower quadrant radiating up toward his liver.  He states he has a history of constipation and this morning and took a laxative with a good result.  The patient denies nausea vomiting and or diaphoresis.  He also denies loss of appetite fever or chills.  There are no exacerbating or relieving factors.  The pain is rated at 7/10 on the pain scale.  Patient also denies anorexia        Review of patient's allergies indicates:   Allergen Reactions    Percocet [oxycodone-acetaminophen] Other (See Comments)     Confusion     Past Medical History:   Diagnosis Date    A-fib     Arthritis     Diabetes mellitus     Hyperlipidemia     Hypertension     KISHAN on CPAP      Past Surgical History:   Procedure Laterality Date    COLONOSCOPY  2007    COLONOSCOPY N/A 9/18/2017    Procedure: COLONOSCOPY/ Split dose;  Surgeon: Subhash Cordova Jr., MD;  Location: [facility] ENDO;  Service: Endoscopy;  Laterality: N/A;    KNEE ARTHROSCOPY Right     TOTAL KNEE ARTHROPLASTY Right 6/18/2019    Procedure: ARTHROPLASTY, KNEE, TOTAL;  Surgeon: Kiko Garcia MD;  Location: [facility] OR;  Service: Orthopedics;  Laterality: Right;  Brandon sven    UPPER GASTROINTESTINAL ENDOSCOPY  2007     Family History   Problem Relation Age of Onset    Heart disease Mother      Social History     Tobacco Use    Smoking status: Never Smoker    Smokeless tobacco: Never Used   Substance Use Topics    Alcohol use: Yes     Comment: occasionall    Drug use: No     Review of Systems   Constitutional: Negative.  Negative for fever.   HENT: Negative.  Negative for sore throat.    Respiratory: Negative for shortness of breath. Cough: For the past  few days and improved     Cardiovascular: Negative.  Negative for chest pain, palpitations and leg swelling.   Gastrointestinal: Positive for abdominal distention, abdominal pain and constipation. Negative for anal bleeding, blood in stool, diarrhea, nausea, rectal pain and vomiting.   Endocrine: Negative.    Genitourinary: Negative.  Negative for dysuria.   Musculoskeletal: Negative.  Negative for back pain.   Skin: Negative for rash.   Allergic/Immunologic: Negative.    Neurological: Negative.  Negative for weakness.   Hematological: Negative.  Does not bruise/bleed easily.   Psychiatric/Behavioral: Negative.        Physical Exam     Initial Vitals [04/03/22 1956]   BP Pulse Resp Temp SpO2   (!) 161/95 85 19 97.4 °F (36.3 °C) 99 %      MAP       --         Physical Exam    Constitutional: He appears well-developed and well-nourished.   HENT:   Head: Normocephalic and atraumatic.   Eyes: Conjunctivae and EOM are normal. Pupils are equal, round, and reactive to light.   Neck: Neck supple.   Normal range of motion.  Cardiovascular: Normal rate, regular rhythm and normal heart sounds. Exam reveals no gallop.    No murmur heard.  Pulmonary/Chest: He has wheezes. He has no rhonchi. He has no rales.   Abdominal: Abdomen is soft. Bowel sounds are normal. He exhibits no distension. There is abdominal tenderness (Right lower quadrant).   Musculoskeletal:         General: Normal range of motion.      Cervical back: Normal range of motion and neck supple.     Neurological: He is alert and oriented to person, place, and time.   Skin: Skin is warm and dry. Capillary refill takes less than 2 seconds.   Psychiatric: He has a normal mood and affect. His behavior is normal. Judgment and thought content normal.         ED Course   Procedures  Labs Reviewed   URINALYSIS, REFLEX TO URINE CULTURE - Abnormal; Notable for the following components:       Result Value    Glucose, UA 4+ (*)     All other components within normal limits     Narrative:     Preferred Collection Type->Urine, Clean Catch  Specimen Source->Urine  Collection Type->Urine, Clean Catch   CBC W/ AUTO DIFFERENTIAL - Abnormal; Notable for the following components:    Hemoglobin 13.2 (*)     Lymph # 0.5 (*)     Gran % 79.5 (*)     Lymph % 7.9 (*)     All other components within normal limits   COMPREHENSIVE METABOLIC PANEL - Abnormal; Notable for the following components:    Sodium 134 (*)     Chloride 94 (*)     Glucose 282 (*)     All other components within normal limits   URINALYSIS MICROSCOPIC    Narrative:     Preferred Collection Type->Urine, Clean Catch  Specimen Source->Urine  Collection Type->Urine, Clean Catch   LIPASE          Imaging Results          CT Abdomen Pelvis  Without Contrast (Final result)  Result time 04/04/22 06:42:23   Procedure changed from CT Chest Abdomen Pelvis Without Contrast (XPD)     Final result by Calvin Alston MD (04/04/22 06:42:23)                 Impression:      Moderate gallbladder distension without stone disease.  Follow-up ultrasound can be obtained as clinically warranted.    Indeterminate hypodensity upper pole left kidney measuring 2.2 cm. Recommend follow-up ultrasound.    All CT scans at this facility use dose modulation, iterative reconstruction, and/or weight based dosing when appropriate to reduce radiation dose to as low as reasonable achievable.      Electronically signed by: Calvin Alston MD  Date:    04/04/2022  Time:    06:42             Narrative:    EXAMINATION:  CT ABDOMEN PELVIS WITHOUT CONTRAST    CLINICAL HISTORY:  Right lower quadrant abdominal and;    TECHNIQUE:  Low dose axial images, sagittal and coronal reformations were obtained from the lung bases to the pubic symphysis.  Oral contrast was not administered.    COMPARISON:  None    FINDINGS:  Heart: Mild cardiomegaly.  No effusion.  Mild coronary disease.    Lung Bases: Clear.    Liver: Normal size and attenuation. No focal lesions.    Gallbladder: No wall  thickening.  Moderate gallbladder distension.  No radiopaque stones are identified.    Bile Ducts: No dilatation.    Pancreas: No obvious mass. No peripancreatic fat stranding.    Spleen: Normal.    Adrenals: Normal.    Kidneys/Ureters: Nonspecific perinephric stranding.  Subcentimeter hypodensity within the lower pole left kidney too small to characterize.  Indeterminate hypodensity upper pole left kidney measuring 2.2 cm.  Recommend follow-up ultrasound.  No mass, hydroureteronephrosis, or nephroureterolithiasis.    Bladder: No wall thickening.    Reproductive organs: Normal.    GI Tract/Mesentery: No evidence of bowel obstruction or inflammation.  Extensive sigmoid diverticulosis.  No evidence of diverticulitis.  No evidence of appendicitis.    Peritoneal Space: No ascites or free air.    Retroperitoneum: No significant adenopathy.    Abdominal wall: Small fat containing umbilical hernia.    Vasculature: No aneurysm. Aorta demonstrates atherosclerotic disease.    Bones: No acute fracture.  Disc space narrowing and spondylosis seen throughout the thoracolumbar spine.  Normal alignment.  Moderate facet arthropathy lower lumbar spine.  No suspicious lytic or sclerotic lesions.                              X-Rays:   Independently Interpreted Readings:   Other Readings:  The CT scan showed moderate gallbladder distention without evidence    Medications   morphine injection 2 mg (2 mg Intravenous Given 4/4/22 0108)     Medical Decision Making:   Initial Assessment:   Abdominal pain secondary to constipation  Differential Diagnosis:   Appendicitis  Inguinal or femoral hernia  Cystitis notes urinary calculi  Gallbladder disease  Clinical Tests:   Lab Tests: Reviewed  The following lab test(s) were unremarkable: CBC, CMP and Urinalysis  ED Management:  The patient was medicated for his pain and observed while undergoing testing.  His symptoms were noted to improve patient was subsequently discharged from the emergency  department with a copy of his CT report.  He was asked to follow up with his primary care physician as well.  Also to return if any issues                      Clinical Impression:   Final diagnoses:  [R10.31] Right lower quadrant abdominal pain (Primary)  [R10.9] Abdominal pain, unspecified abdominal location          ED Disposition Condition    Discharge Stable        ED Prescriptions     Medication Sig Dispense Start Date End Date Auth. Provider    ketorolac (TORADOL) 10 mg tablet Take 1 tablet (10 mg total) by mouth every 6 (six) hours. for 5 days 20 tablet 4/4/2022 4/9/2022 Bindu Souza MD    docusate sodium (COLACE) 100 MG capsule Take 1 capsule (100 mg total) by mouth 2 (two) times daily. 60 capsule 4/4/2022  Bindu Souza MD        Follow-up Information     Follow up With Specialties Details Why Contact Info    Fritz Levy III, MD Family Medicine Schedule an appointment as soon as possible for a visit in 1 week  [address] [facility]  [place] [id number] [phone]             Bindu Souza MD  04/06/22 0328

## 2022-04-05 NOTE — PROVIDER PROGRESS NOTES - EMERGENCY DEPT.
Appendicitis was ruled out in this patient with right lower quadrant abdominal pain.  It was felt that his pain was secondary to constipation.    the CT scan  showed evidence of a dilated gallbladder without stones.  This was discussed with the patient.  The radiologist suggested an ultrasound re-evaluation of patient did not demonstrate any evidence of right upper quadrant pain the patient was subsequently discharged from the emergency department and asked to I follow up with his primary physician care physician/.  Was asked to return to the emergency department if any problems  Encounter Date: 4/3/2022    ED Physician Progress Notes

## 2022-04-12 ENCOUNTER — HOSPITAL ENCOUNTER (OUTPATIENT)
Dept: RADIOLOGY | Facility: HOSPITAL | Age: 72
Discharge: HOME | End: 2022-04-12
Attending: FAMILY MEDICINE
Payer: MEDICARE

## 2022-04-12 DIAGNOSIS — K82.1: ICD-10-CM

## 2022-04-12 PROCEDURE — A9537 TC99M MEBROFENIN: HCPCS | Mod: PO

## 2022-04-12 PROCEDURE — 78226 HEPATOBILIARY SYSTEM IMAGING: CPT | Mod: TC,PO

## 2022-04-14 ENCOUNTER — TELEPHONE (OUTPATIENT)
Dept: SLEEP MEDICINE | Facility: CLINIC | Age: 72
End: 2022-04-14
Payer: MEDICARE

## 2022-04-18 ENCOUNTER — OFFICE VISIT (OUTPATIENT)
Dept: SLEEP MEDICINE | Facility: CLINIC | Age: 72
End: 2022-04-18
Payer: MEDICARE

## 2022-04-18 VITALS
HEART RATE: 78 BPM | SYSTOLIC BLOOD PRESSURE: 125 MMHG | DIASTOLIC BLOOD PRESSURE: 81 MMHG | WEIGHT: 261.69 LBS | BODY MASS INDEX: 35.44 KG/M2 | HEIGHT: 72 IN

## 2022-04-18 DIAGNOSIS — G47.33 OSA (OBSTRUCTIVE SLEEP APNEA): Primary | ICD-10-CM

## 2022-04-18 DIAGNOSIS — G47.30 SLEEP APNEA, UNSPECIFIED TYPE: ICD-10-CM

## 2022-04-18 PROCEDURE — 3079F DIAST BP 80-89 MM HG: CPT | Mod: CPTII,S$GLB,, | Performed by: PSYCHIATRY & NEUROLOGY

## 2022-04-18 PROCEDURE — 1160F PR REVIEW ALL MEDS BY PRESCRIBER/CLIN PHARMACIST DOCUMENTED: ICD-10-PCS | Mod: CPTII,S$GLB,, | Performed by: PSYCHIATRY & NEUROLOGY

## 2022-04-18 PROCEDURE — 3079F PR MOST RECENT DIASTOLIC BLOOD PRESSURE 80-89 MM HG: ICD-10-PCS | Mod: CPTII,S$GLB,, | Performed by: PSYCHIATRY & NEUROLOGY

## 2022-04-18 PROCEDURE — 1101F PT FALLS ASSESS-DOCD LE1/YR: CPT | Mod: CPTII,S$GLB,, | Performed by: PSYCHIATRY & NEUROLOGY

## 2022-04-18 PROCEDURE — 3074F PR MOST RECENT SYSTOLIC BLOOD PRESSURE < 130 MM HG: ICD-10-PCS | Mod: CPTII,S$GLB,, | Performed by: PSYCHIATRY & NEUROLOGY

## 2022-04-18 PROCEDURE — 4010F ACE/ARB THERAPY RXD/TAKEN: CPT | Mod: CPTII,S$GLB,, | Performed by: PSYCHIATRY & NEUROLOGY

## 2022-04-18 PROCEDURE — 3288F PR FALLS RISK ASSESSMENT DOCUMENTED: ICD-10-PCS | Mod: CPTII,S$GLB,, | Performed by: PSYCHIATRY & NEUROLOGY

## 2022-04-18 PROCEDURE — 1126F PR PAIN SEVERITY QUANTIFIED, NO PAIN PRESENT: ICD-10-PCS | Mod: CPTII,S$GLB,, | Performed by: PSYCHIATRY & NEUROLOGY

## 2022-04-18 PROCEDURE — 1101F PR PT FALLS ASSESS DOC 0-1 FALLS W/OUT INJ PAST YR: ICD-10-PCS | Mod: CPTII,S$GLB,, | Performed by: PSYCHIATRY & NEUROLOGY

## 2022-04-18 PROCEDURE — 99204 PR OFFICE/OUTPT VISIT, NEW, LEVL IV, 45-59 MIN: ICD-10-PCS | Mod: S$GLB,,, | Performed by: PSYCHIATRY & NEUROLOGY

## 2022-04-18 PROCEDURE — 99204 OFFICE O/P NEW MOD 45 MIN: CPT | Mod: S$GLB,,, | Performed by: PSYCHIATRY & NEUROLOGY

## 2022-04-18 PROCEDURE — 1160F RVW MEDS BY RX/DR IN RCRD: CPT | Mod: CPTII,S$GLB,, | Performed by: PSYCHIATRY & NEUROLOGY

## 2022-04-18 PROCEDURE — 3008F BODY MASS INDEX DOCD: CPT | Mod: CPTII,S$GLB,, | Performed by: PSYCHIATRY & NEUROLOGY

## 2022-04-18 PROCEDURE — 1126F AMNT PAIN NOTED NONE PRSNT: CPT | Mod: CPTII,S$GLB,, | Performed by: PSYCHIATRY & NEUROLOGY

## 2022-04-18 PROCEDURE — 3074F SYST BP LT 130 MM HG: CPT | Mod: CPTII,S$GLB,, | Performed by: PSYCHIATRY & NEUROLOGY

## 2022-04-18 PROCEDURE — 1159F PR MEDICATION LIST DOCUMENTED IN MEDICAL RECORD: ICD-10-PCS | Mod: CPTII,S$GLB,, | Performed by: PSYCHIATRY & NEUROLOGY

## 2022-04-18 PROCEDURE — 3288F FALL RISK ASSESSMENT DOCD: CPT | Mod: CPTII,S$GLB,, | Performed by: PSYCHIATRY & NEUROLOGY

## 2022-04-18 PROCEDURE — 4010F PR ACE/ARB THEARPY RXD/TAKEN: ICD-10-PCS | Mod: CPTII,S$GLB,, | Performed by: PSYCHIATRY & NEUROLOGY

## 2022-04-18 PROCEDURE — 1159F MED LIST DOCD IN RCRD: CPT | Mod: CPTII,S$GLB,, | Performed by: PSYCHIATRY & NEUROLOGY

## 2022-04-18 PROCEDURE — 3008F PR BODY MASS INDEX (BMI) DOCUMENTED: ICD-10-PCS | Mod: CPTII,S$GLB,, | Performed by: PSYCHIATRY & NEUROLOGY

## 2022-04-18 NOTE — PROGRESS NOTES
Ramirez Aguilar Jr. is a 72 y.o. male is here to be evaluated for a sleep disorder; referred by Brandon Levy MD.    The patient reports excessive daytime sleepiness, excessive daytime fatigue, snoring and interrupted sleep since 8 yrs ago..   Ramirez Aguilar Jr. denied   witnessed breathing pauses and  gasping for air in sleep.      He believes his KISHAN was borderline.    His le company insisted that he used the machine.  With time he got used to sleep with his machine and sleeps better with it than without it.  His empoyee's company used to provide CPAP supplies. He would like to eventually replace his APAP as it may be due.     He brought the machine: Air[employer] Resmed - about 8 years old   6-20 cm H2O; 90% pressure is 9.6 cm H2O Airsence Resmed  30/30  8 hrs  Leak 28 L/min\  AHI 6.2, AI - 2.3, GORAN -1.3    The patient reports improved sleep continuity and daytime sleepiness on PAP. .  Denies break through snoring.  No dry mouth.   No aerophagia or air hunger. No significant mask leaks and discomfort.    The patient denies difficulty falling and staying asleep.    Ramirez Aguilar Jr.  denies symptoms concerning for parasomnia except for occasional somniloquy.  The patient  denies auxiliary symptoms of narcolepsy including sleep onset paralysis, hypnagogic hallucinations, sleep attacks and cataplexy.    Ramirez Aguilar Jr. denied symptoms concerning for RLS; nocturnal leg movements have not been noticed.   The patient does not experience sleep related leg cramps.         Sleep studies  Sleep clinic in [place], [place]  - about 8 yrs ago  Address: [address] [place] [address] [address], [place] [address]  Areas served: [facility] and nearby areas  Hours:   Open ? Closes 5PM  Phone: [phone]  Check insurance info  Suggest an edit    PS: Called there, study was no longer available.        Occupation:retired; recovering from night shifts.  Bed partner: -  Exercise routine: active  Caffeine:  - beverages per  day  Alcohol: occasional    EPWORTH SLEEPINESS SCALE TOTAL SCORE  4/18/2022   Total score 2         EPWORTH SLEEPINESS SCALE 4/18/2022   Sitting and reading 0   Watching TV 0   Sitting, inactive in a public place (e.g. a theatre or a meeting) 0   As a passenger in a car for an hour without a break 0   Lying down to rest in the afternoon when circumstances permit 2   Sitting and talking to someone 0   Sitting quietly after a lunch without alcohol 0   In a car, while stopped for a few minutes in traffic 0   Total score 2         EPWORTH SLEEPINESS SCALE 4/18/2022   Sitting and reading 0   Watching TV 0   Sitting, inactive in a public place (e.g. a theatre or a meeting) 0   As a passenger in a car for an hour without a break 0   Lying down to rest in the afternoon when circumstances permit 2   Sitting and talking to someone 0   Sitting quietly after a lunch without alcohol 0   In a car, while stopped for a few minutes in traffic 0   Total score 2     Sleep Clinic New Patient 4/18/2022   What time do you go to bed on a week day? (Give a range) midnight   What time do you go to bed on a day off? (Give a range) midnight   How long does it take you to fall asleep? (Give a range) immediate   On average, how many times per night do you wake up? 1   How long does it take you to fall back into sleep? (Give a range) immediate   What time do you wake up to start your day on a week day? (Give a range) 8-9 am   What time do you wake up to start your day on a day off? (Give a range) 8-9 am   What time do you get out of bed? (Give a range) immediate   On average, how many hours do you sleep? 8-9 hours   On average, how many naps do you take per day? 0   Rate your sleep quality from 0 to 5 (0-poor, 5-great). 5   Have you experienced:  N/a   How much weight have you lost or gained (in lbs.) in the last year? 0   On average, how many times per night do you go to the bathroom?  1   Have you ever had a sleep study/CPAP machine/surgery  for sleep apnea? Yes   Have you ever had a CPAP machine for sleep apnea? Yes   Have you ever had surgery for sleep apnea? No       Sleep Clinic ROS  4/18/2022   Difficulty breathing through the nose?  No   Sore throat or dry mouth in the morning? Yes   Irregular or very fast heart beat?  Yes   Shortness of breath?  No   Acid reflux? Yes   Body aches and pains?  Sometimes   Morning headaches? No   Dizziness? No   Mood changes?  No   Do you exercise?  Sometimes   Do you feel like moving your legs a lot?  No       DME: He brought the machine: [employer] Resmed in early 2022 from [facility].         PAST MEDICAL HISTORY:    Active Ambulatory Problems     Diagnosis Date Noted    Screening for colorectal cancer 09/18/2017    Right knee DJD 06/18/2019    Type 2 diabetes mellitus without complication, with long-term current use of insulin 06/19/2019     Resolved Ambulatory Problems     Diagnosis Date Noted    No Resolved Ambulatory Problems     Past Medical History:   Diagnosis Date    A-fib     Arthritis     Diabetes mellitus     Hyperlipidemia     Hypertension     KISHAN on CPAP                 PAST SURGICAL HISTORY:    Past Surgical History:   Procedure Laterality Date    COLONOSCOPY  2007    COLONOSCOPY N/A 9/18/2017    Procedure: COLONOSCOPY/ Split dose;  Surgeon: Subhash Cordova Jr., MD;  Location: [facility];  Service: Endoscopy;  Laterality: N/A;    KNEE ARTHROSCOPY Right     TOTAL KNEE ARTHROPLASTY Right 6/18/2019    Procedure: ARTHROPLASTY, KNEE, TOTAL;  Surgeon: Kiko Garcia MD;  Location: [facility];  Service: Orthopedics;  Laterality: Right;  Brandon machuca    UPPER GASTROINTESTINAL ENDOSCOPY  2007         FAMILY HISTORY:                Family History   Problem Relation Age of Onset    Heart disease Mother        SOCIAL HISTORY:          Tobacco:   Social History     Tobacco Use   Smoking Status Never Smoker   Smokeless Tobacco Never Used       alcohol use:    Social History     Substance and  Sexual Activity   Alcohol Use Yes    Comment: occasionall                   ALLERGIES:    Review of patient's allergies indicates:   Allergen Reactions    Percocet [oxycodone-acetaminophen] Other (See Comments)     Confusion       CURRENT MEDICATIONS:    Current Outpatient Medications   Medication Sig Dispense Refill    apixaban (ELIQUIS) 5 mg Tab Take 5 mg by mouth 2 (two) times daily.      atorvastatin (LIPITOR) 20 MG tablet Take 20 mg by mouth once daily.      diltiaZEM (CARDIZEM CD) 240 MG 24 hr capsule Take 240 mg by mouth once daily.      docusate sodium (COLACE) 100 MG capsule Take 1 capsule (100 mg total) by mouth every 12 (twelve) hours.  0    docusate sodium (COLACE) 100 MG capsule Take 1 capsule (100 mg total) by mouth 2 (two) times daily. 60 capsule 0    glimepiride (AMARYL) 4 MG tablet Take 4 mg by mouth before breakfast.      hydroCHLOROthiazide (HYDRODIURIL) 25 MG tablet Take 25 mg by mouth once daily.      insulin glargine 100 units/mL (3mL) SubQ pen Inject 18 Units into the skin every evening.      irbesartan (AVAPRO) 150 MG tablet Take 150 mg by mouth every evening.      metformin (GLUCOPHAGE-XR) 750 MG 24 hr tablet Take 750 mg by mouth daily with breakfast.      omeprazole (PRILOSEC) 20 MG capsule Take 20 mg by mouth once daily.      oxyCODONE-acetaminophen (PERCOCET) 5-325 mg per tablet Take 1 tablet by mouth every 4 (four) hours as needed for Pain. 42 tablet 0    promethazine (PHENERGAN) 12.5 MG Tab TAKE 1 TABLET BY MOUTH EVERY 6 HOURS AS NEEDED FOR NAUSEA AND VOMITING 40 tablet 2    promethazine (PHENERGAN) 25 MG tablet Take 0.5 tablets (12.5 mg total) by mouth every 6 (six) hours as needed for Nausea. 40 tablet 2    sildenafil (VIAGRA) 100 MG tablet Take 100 mg by mouth daily as needed for Erectile Dysfunction.      traMADol (ULTRAM) 50 mg tablet Take 50 mg by mouth every 6 (six) hours as needed for Pain.       No current facility-administered medications for this visit.        "                 PHYSICAL EXAM:  /81   Pulse 78   Ht 6' (1.829 m)   Wt 118.7 kg (261 lb 11.2 oz)   BMI 35.49 kg/m²   GENERAL: Normal development, well groomed.  HEENT:   HEENT:  Conjunctivae are non-erythematous; Pupils equal, round, and reactive to light; Nose is symmetrical; Nasal mucosa is pink and moist; Septum is midline; Inferior turbinates are normal; Nasal airflow is normal; Posterior pharynx is pink; Modified Mallampati:III; Posterior palate is low; Tonsils not visualized; Uvula is normal and pink;Tongue is enlarged; Dentition is fair; No TMJ tenderness; Jaw opening and protrusion without click and without discomfort.  NECK: Supple. Neck circumference is 17 inches. No thyromegaly. No palpable nodes.     SKIN: On face and neck: No abrasions, no rashes, no lesions.  No subcutaneous nodules are palpable.  RESPIRATORY: Chest is clear to auscultation.  Normal chest expansion and non-labored breathing at rest.  CARDIOVASCULAR: Normal S1, S2.  No murmurs, gallops or rubs. No carotid bruits bilaterally.  No edema. No clubbing. No cyanosis.    NEURO: Oriented to time, place and person. Normal attention span and concentration. Gait normal.    PSYCH: Affect is full. Mood is normal  MUSCULOSKELETAL: Moves 4 extremities. Gait normal.           ASSESSMENT:    1. Sleep Apnea NEC. Previously diagnosed KISHAN.  The patient symptomatically has  excessive daytime sleepiness, snoring, excessive daytime fatigue, interrupted sleep and nocturia  with exam findings of "a crowded oral airway and elevated body mass index. The patient has medical co-morbidities of hypertension,  which can be worsened by KISHAN. This warrants treatment.             PLAN:    Will try to get his study from his clinic in [facility]; if nn/a may redo HST or PSG. The nature of this procedure and its indication was discussed with the patient. Will order FFM (Quattro Air Medium).     He prefers to use [facility].    ADDENDUM 04/21/2022: Above " facility no longer has his sleep study on file.  I will order HST for re qualification. Once HST results are back, I will order supplies through [facility]  Won;t order new machine yet (his machine is 8=-10 years old, yet it is a Resmed 10).      During our discussion today, we talked about the etiology of  KISHAN as well as the potential ramifications of untreated sleep apnea, which could include daytime sleepiness, hypertension, heart disease and/or stroke.  We discussed potential treatment options, which could include weight loss, body positioning, continuous positive airway pressure (CPAP), or referral for surgical consideration. Meanwhile, he  is urged to avoid supine sleep, weight gain and alcoholic beverages since all of these can worsen KISHAN.     The patient was given open opportunity to ask questions and/or express concerns about treatment plan. Two point patient identifier confirmed.       Precautions: The patient was advised to abstain from driving should he feel sleepy or drowsy.    Follow up: MD after the sleep study has been completed.     31-minute visit. >50% spent counseling patient and coordination of care.  The patient was  cautioned against drowsy driving.

## 2022-04-18 NOTE — Clinical Note
Kalyn, CAn you check if they have his sleep study? CAn you please also get him sign the release?  1 [facility] Sleep clinic in [place], [place] Address: [address], [place] [id number] Areas served: [facility] and nearby areas Hours:  Open · Closes 5PM Phone: [phone] Check insurance info Suggest an edit

## 2022-05-18 ENCOUNTER — TELEPHONE (OUTPATIENT)
Dept: SLEEP MEDICINE | Facility: OTHER | Age: 72
End: 2022-05-18
Payer: MEDICARE

## 2022-05-23 ENCOUNTER — HOSPITAL ENCOUNTER (OUTPATIENT)
Dept: SLEEP MEDICINE | Facility: OTHER | Age: 72
Discharge: HOME | End: 2022-05-23
Attending: PSYCHIATRY & NEUROLOGY
Payer: MEDICARE

## 2022-05-23 DIAGNOSIS — G47.30 SLEEP APNEA, UNSPECIFIED TYPE: ICD-10-CM

## 2022-05-23 DIAGNOSIS — G47.33 OSA (OBSTRUCTIVE SLEEP APNEA): ICD-10-CM

## 2022-05-23 PROCEDURE — 95800 SLP STDY UNATTENDED: CPT

## 2022-05-23 PROCEDURE — 95806 PR SLEEP STUDY, UNATTENDED, SIMUL RECORD HR/O2 SAT/RESP FLOW/RESP EFFT: ICD-10-PCS | Mod: 26,,, | Performed by: PSYCHIATRY & NEUROLOGY

## 2022-05-23 PROCEDURE — 95806 SLEEP STUDY UNATT&RESP EFFT: CPT | Mod: 26,,, | Performed by: PSYCHIATRY & NEUROLOGY

## 2022-05-24 NOTE — PROGRESS NOTES
Patient, Ramirez Aguilar Jr. (MRN #[id number]), presented with a recorded BMI of 35.49 kg/m^2 and a documented comorbidity(s):  - Obstructive Sleep Apnea  to which the severe obesity is a contributing factor. This is consistent with the definition of severe obesity (BMI 35.0-39.9) with comorbidity (ICD-10 E66.01, Z68.35). The patient's severe obesity was monitored, evaluated, addressed and/or treated. This addendum to the medical record is made on 05/23/2022.

## 2022-06-06 NOTE — PROCEDURES
by overall AHI (apnea hypopnea index). However, findings on this study suggest that the degree of sleep disordered  breathing is in the moderate severity range, when RDI is measured. This study was technically adequate to allow for  interpretation.  CLINICAL HISTORY: 72 year old male presented with: 18 inch neck, BMI of 33.5, an Keshena sleepiness score of 0, history of  hypertension, heart disease, diabetes, a previous diagnosis of KISHAN and symptoms of nocturnal witnessed apneas. Based  on the clinical history, the patient has a high pre-test probability of having Severe KISHAN.  SLEEP STUDY FINDINGS: Patient underwent a 1 night Home Sleep Test and by behavioral criteria, slept for approximately  5.92 hours, with a sleep latency of 6 minutes and a sleep efficiency of 93.4%. Mild sleep disordered breathing (AHI=7) is  noted based on a 4% hypopnea desaturation criteria. The patient slept supine 35.6% of the night based on valid recording  time of 5.92 hours. When considering more subtle measures of sleep disordered breathing, the overall respiratory  disturbance index is mild(RDI=17) based on a 1% hypopnea desaturation criteria with confirmation by surrogate arousal  indicators. The apneas/hypopneas are accompanied by minimal oxygen desaturation (percent time below 90% SpO2: 1%,  Min SpO2: 85%). The average desaturation across all sleep disordered breathing events is 2.8%. Snoring occurs for 0.1% (30  dB) of the study. The mean pulse rate is 80.2 BPM, with very frequent pulse rate variability (161 events with >= 6 BPM  increase/decrease per hour).  TREATMENT CONSIDERATIONS: Consider trial of Auto-titrating CPAP 6-20 cm, mask of patient's choice, and heated  humidification. If patient has difficulty with CPAP adherence or ongoing KISHAN symptoms or despite CPAP adherence, then  consider an in-lab titration sleep study in order to determine optimal fixed CPAP setting. Alternatively consider oral  appliance fitted by a dentist  specializing in these devices, or surgical consultation for uvulopalatopharyngoplasty (UPPP)  for treatment of obstructive sleep apnea.  DISEASE MANAGEMENT CONSIDERATIONS: Definitive treatment for KISHAN is recommended. Consider Sleep Clinic referral for  KISHAN management.  Signature: Date: 06- 13:52:52 EST  Study Review: The raw data of this YADY study has

## 2022-06-09 DIAGNOSIS — G47.33 OSA (OBSTRUCTIVE SLEEP APNEA): Primary | ICD-10-CM

## 2022-06-14 DIAGNOSIS — G47.33 OSA (OBSTRUCTIVE SLEEP APNEA): Primary | ICD-10-CM

## 2022-06-21 ENCOUNTER — TELEPHONE (OUTPATIENT)
Dept: SLEEP MEDICINE | Facility: CLINIC | Age: 72
End: 2022-06-21
Payer: MEDICARE

## 2022-06-21 NOTE — TELEPHONE ENCOUNTER
Spoke to the pt and informed him that his order was send on 06/14/2022 for his CPAP machine. It was send to [facility]

## 2022-07-06 ENCOUNTER — TELEPHONE (OUTPATIENT)
Dept: SLEEP MEDICINE | Facility: CLINIC | Age: 72
End: 2022-07-06
Payer: MEDICARE

## 2022-07-06 NOTE — TELEPHONE ENCOUNTER
"Chief Complaint   Patient presents with     Consult     DVT of left lower extremity        Initial BP (!) 133/93 (BP Location: Right arm, Patient Position: Sitting, Cuff Size: Adult Large)  Pulse 85  Temp 98  F (36.7  C) (Oral)  Resp 16  Ht 1.854 m (6' 0.99\")  Wt 119.9 kg (264 lb 6.4 oz)  SpO2 97%  BMI 34.89 kg/m2 Estimated body mass index is 34.89 kg/(m^2) as calculated from the following:    Height as of this encounter: 1.854 m (6' 0.99\").    Weight as of this encounter: 119.9 kg (264 lb 6.4 oz).  Medication Reconciliation: [place] Irving CMA  7/25/2017 3:13 PM        " Spoke to the pt and he stated that his order was supposed to go to [facility]. Staff faxed over his order for his CPAP machine.Fax number [phone]

## 2024-02-04 NOTE — PT/OT/SLP PROGRESS
Physical Therapy Treatment    Patient Name:  Ramirez Aguilar Jr.   MRN:  [id number]    Recommendations:     Discharge Recommendations:  home health PT   Discharge Equipment Recommendations: none   Barriers to discharge: decreased mobility,strength and ROM    Assessment:     Ramirez Aguilar Jr. is a 69 y.o. male admitted with a medical diagnosis of Right knee DJD.  He presents with the following impairments/functional limitations:  weakness, impaired endurance, impaired functional mobilty, gait instability, impaired balance, decreased lower extremity function, decreased ROM, pain, impaired skin, orthopedic precautions,pt with improving mobility and endurance,pt appears ready for discharge home and will benefit from HH services upon discharge.    Rehab Prognosis: Good; patient would benefit from acute skilled PT services to address these deficits and reach maximum level of function.    Recent Surgery: Procedure(s) (LRB):  ARTHROPLASTY, KNEE, TOTAL (Right) 1 Day Post-Op    Plan:     During this hospitalization, patient to be seen BID to address the identified rehab impairments via gait training, therapeutic activities, therapeutic exercises and progress toward the following goals:    · Plan of Care Expires:  07/18/19    Subjective     Chief Complaint: n/a  Patient/Family Comments/goals: pt is pleased with increased gait.  Pain/Comfort:  · Pain Rating 1: (no rating)  · Location - Side 1: Right  · Location - Orientation 1: generalized  · Location 1: knee  · Pain Addressed 1: Reposition, Distraction, Nurse notified      Objective:     Communicated with nsg prior to session.  Patient found up in chair with bed alarm, telemetry(Q ball) upon PT entry to room.     General Precautions: Standard, fall   Orthopedic Precautions:RLE weight bearing as tolerated   Braces: N/A     Functional Mobility:  · Bed Mobility:     · Supine to Sit: supervision  · Transfers:     · Sit to Stand:  stand by assistance with rolling walker  · Bed to  Chair: contact guard assistance with  rolling walker  using  ambulation  · Gait: amb ~85' with RW and CGA  · Balance: good standing balance with RW      AM-PAC 6 CLICK MOBILITY  Turning over in bed (including adjusting bedclothes, sheets and blankets)?: 3  Sitting down on and standing up from a chair with arms (e.g., wheelchair, bedside commode, etc.): 3  Moving from lying on back to sitting on the side of the bed?: 3  Moving to and from a bed to a chair (including a wheelchair)?: 3  Need to walk in hospital room?: 3  Climbing 3-5 steps with a railing?: 2  Basic Mobility Total Score: 17       Therapeutic Activities and Exercises: le supine ex's X 15 reps inc: ap,qs,hs,abd/add,slr with assist on R,increased time required with mobility.       Patient left up in chair with all lines intact, call button in reach, chair alarm on and nsg notified..    GOALS: see general POC  Multidisciplinary Problems     Physical Therapy Goals        Problem: Physical Therapy Goal    Goal Priority Disciplines Outcome Goal Variances Interventions   Physical Therapy Goal     PT, PT/OT Ongoing (interventions implemented as appropriate)     Description:  Goals to be met by: 19     Patient will increase functional independence with mobility by performin. Supine <> sit with supervision  2. Sit to stand transfer with Supervision  3. Bed to chair transfer with Supervision using Rolling Walker  4. Gait  x 100 feet with Supervision using Rolling Walker.   5. Lower extremity exercise program x 10 reps per handout, with supervision                      Time Tracking:     PT Received On: 19  PT Start Time: 0757     PT Stop Time: 0836  PT Total Time (min): 39 min     Billable Minutes: Gait Training 17, Therapeutic Activity 10 and Therapeutic Exercise 12    Treatment Type: Treatment  PT/PTA: PTA     PTA Visit Number: 1     Casimiro Huitron, PTA  2019   Fear of death or the actual act of killing self

## 2024-05-20 NOTE — PATIENT INSTRUCTIONS
Colonoscopy     A camera attached to a flexible tube with a viewing lens is used to take video pictures.     Colonoscopy is a test to view the inside of your lower digestive tract (colon and rectum). Sometimes it can show the last part of the small intestine (ileum). During the test, small pieces of tissue may be removed for testing. This is called a biopsy. Small growths, such as polyps, may also be removed.   Why is colonoscopy done?  The test is done to help look for colon cancer. And it can help find the source of abdominal pain, bleeding, and changes in bowel habits. It may be needed once a year, depending on factors such as your:  · Age  · Health history  · Family health history  · Symptoms  · Results from any prior colonoscopy  Risks and possible complications  These include:  · Bleeding               · A puncture or tear in the colon   · Risks of anesthesia  · A cancer lesion not being seen  Getting ready   To prepare for the test:  · Talk with your healthcare provider about the risks of the test (see below). Also ask your healthcare provider about alternatives to the test.  · Tell your healthcare provider about any medicines you take. Also tell him or her about any health conditions you may have.  · Make sure your rectum and colon are empty for the test. Follow the diet and bowel prep instructions exactly. If you dont, the test may need to be rescheduled.  · Plan for a friend or family member to drive you home after the test.     Colonoscopy provides an inside view of the entire colon.     You may discuss the results with your doctor right away or at a future visit.  During the test   The test is usually done in the hospital on an outpatient basis. This means you go home the same day. The procedure takes about 30 minutes. During that time:  · You are given relaxing (sedating) medicine through an IV line. You may be drowsy, or fully asleep.  · The healthcare provider will first give you a physical exam to  We discussed the risks inherent in a biopsy including but not limited to; bleeding, infection, urinary retention, and pain associated with the procedure.  We recommend stopping any aspirin or nonsteroidal anti-inflammatories or other blood thinners approximately 5 days prior to the procedure. If you are taking any of these medications for heart issues or other specific reasons, he needs to consult with the prescribing doctor before stopping treatment.  We explained that we give prophylactic antibiotic(s) around the time of the procedure to help prevent serious infectious complications (which have been on the rise).  We also discussed concerns with over-treatment of prostate cancer and that the main purpose of the biopsy is to hopefully rule out high-grade prostate cancer. After a full discussion regarding the potential risks, benefits, and treatment alternatives, the patient has decided to proceed with an MRI guided prostate biopsy via a transperineal approach.      Please notify us following your cardiology appointment this week if your physician feels that you would have increased risk for anesthesia or any invasive procedures as this biopsy can be deferred and repeat PSA in 6 months.   check for anal and rectal problems.  · Then the anus is lubricated and the scope inserted.  · If you are awake, you may have a feeling similar to needing to have a bowel movement. You may also feel pressure as air is pumped into the colon. Its OK to pass gas during the procedure.  · Biopsy, polyp removal, or other treatments may be done during the test.  After the test   You may have gas right after the test. It can help to try to pass it to help prevent later bloating. Your healthcare provider may discuss the results with you right away. Or you may need to schedule a follow-up visit to talk about the results. After the test, you can go back to your normal eating and other activities. You may be tired from the sedation and need to rest for a few hours.  Date Last Reviewed: 11/1/2016  © 2745-6848 [employer]. [address] [place] [address], [place] [address]. All rights reserved. This information is not intended as a substitute for professional medical care. Always follow your healthcare professional's instructions.

## 2024-08-05 ENCOUNTER — TELEPHONE (OUTPATIENT)
Dept: SLEEP MEDICINE | Facility: CLINIC | Age: 74
End: 2024-08-05
Payer: MEDICARE

## 2024-08-05 DIAGNOSIS — G47.33 OSA (OBSTRUCTIVE SLEEP APNEA): Primary | ICD-10-CM

## 2024-08-06 DIAGNOSIS — G47.33 OSA (OBSTRUCTIVE SLEEP APNEA): Primary | ICD-10-CM

## 2024-11-26 ENCOUNTER — OFFICE VISIT (OUTPATIENT)
Dept: SLEEP MEDICINE | Facility: CLINIC | Age: 74
End: 2024-11-26
Payer: MEDICARE

## 2024-11-26 VITALS
DIASTOLIC BLOOD PRESSURE: 86 MMHG | HEIGHT: 72 IN | SYSTOLIC BLOOD PRESSURE: 149 MMHG | BODY MASS INDEX: 34.4 KG/M2 | HEART RATE: 125 BPM | WEIGHT: 254 LBS

## 2024-11-26 DIAGNOSIS — G47.33 OSA (OBSTRUCTIVE SLEEP APNEA): Primary | ICD-10-CM

## 2024-11-26 PROCEDURE — 1159F MED LIST DOCD IN RCRD: CPT | Mod: CPTII,S$GLB,, | Performed by: NURSE PRACTITIONER

## 2024-11-26 PROCEDURE — 3008F BODY MASS INDEX DOCD: CPT | Mod: CPTII,S$GLB,, | Performed by: NURSE PRACTITIONER

## 2024-11-26 PROCEDURE — 99999 PR PBB SHADOW E&M-EST. PATIENT-LVL III: CPT | Mod: PBBFAC,,, | Performed by: NURSE PRACTITIONER

## 2024-11-26 PROCEDURE — 99214 OFFICE O/P EST MOD 30 MIN: CPT | Mod: S$GLB,,, | Performed by: NURSE PRACTITIONER

## 2024-11-26 PROCEDURE — 3077F SYST BP >= 140 MM HG: CPT | Mod: CPTII,S$GLB,, | Performed by: NURSE PRACTITIONER

## 2024-11-26 PROCEDURE — 4010F ACE/ARB THERAPY RXD/TAKEN: CPT | Mod: CPTII,S$GLB,, | Performed by: NURSE PRACTITIONER

## 2024-11-26 PROCEDURE — 3288F FALL RISK ASSESSMENT DOCD: CPT | Mod: CPTII,S$GLB,, | Performed by: NURSE PRACTITIONER

## 2024-11-26 PROCEDURE — 3079F DIAST BP 80-89 MM HG: CPT | Mod: CPTII,S$GLB,, | Performed by: NURSE PRACTITIONER

## 2024-11-26 PROCEDURE — 1101F PT FALLS ASSESS-DOCD LE1/YR: CPT | Mod: CPTII,S$GLB,, | Performed by: NURSE PRACTITIONER

## 2024-11-26 NOTE — PROGRESS NOTES
Cc: KISHAN, new to me    Remains adherent qhs with apap 6-20cm. Got new machine 2022 from [employer]. Can't sleep w/o it. May get battery pack for it. Denies recurrent symptoms. Using F20    Interrogation 6mos avg 7h/n AHI 2.6, 90 %tile 9cm    BP (!) 149/86 (BP Location: Left arm, Patient Position: Sitting)   Pulse (!) 125   Ht 6' (1.829 m)   Wt 115.2 kg (253 lb 15.5 oz)   BMI 34.44 kg/m²     HST 2022 AHI 7(RDI 17)  Prior dx GA, study report N/A      Assessment:  KISHAN, mild. Adherent with PAP, benefits fromtherapy. AHI<5    Plan:  Continue apap 6-20cm. Rtc 2 yrs, sooner if needed  [facility] supplies  Discussed control of KISHAN

## 2025-06-03 ENCOUNTER — HOSPITAL ENCOUNTER (OUTPATIENT)
Dept: RADIOLOGY | Facility: HOSPITAL | Age: 75
Discharge: HOME | End: 2025-06-03
Attending: FAMILY MEDICINE
Payer: MEDICARE

## 2025-06-03 DIAGNOSIS — R27.0 ATAXIA: ICD-10-CM

## 2025-06-03 PROCEDURE — 70450 CT HEAD/BRAIN W/O DYE: CPT | Mod: TC,PN

## 2025-06-03 PROCEDURE — 70450 CT HEAD/BRAIN W/O DYE: CPT | Mod: 26,,, | Performed by: RADIOLOGY

## (undated) DEVICE — SEE MEDLINE ITEM 152622

## (undated) DEVICE — COVER OVERHEAD SURG LT BLUE

## (undated) DEVICE — DRAPE STERI U-SHAPED 47X51IN

## (undated) DEVICE — PULSAVAC ZIMMER

## (undated) DEVICE — TOURNIQUET SB QC DP 34X4IN

## (undated) DEVICE — DRAPE INCISE IOBAN 2 23X23IN

## (undated) DEVICE — ELECTRODE REM PLYHSV RETURN 9

## (undated) DEVICE — SEE MEDLINE ITEM 146231

## (undated) DEVICE — PAD ABDOMINAL 5X9 STERILE

## (undated) DEVICE — PAD CAST SPECIALIST STRL 6

## (undated) DEVICE — GLOVE 8 PROTEXIS PI ORTHO

## (undated) DEVICE — SUT 2/0 36IN COATED VICRYL

## (undated) DEVICE — SUT VICRYL 1 CT-1 27 UNDIE

## (undated) DEVICE — COVER BACK TABLE 72X21

## (undated) DEVICE — PADDING CAST SPECIALIST 6X4YD

## (undated) DEVICE — GLOVE PROTEXIS HYDROGEL SZ8

## (undated) DEVICE — BLADE SAW SIGMA TSS SYS 6

## (undated) DEVICE — SYS CLSR DERMABOND PRINEO 22CM

## (undated) DEVICE — TAPE SILK 3IN

## (undated) DEVICE — APPLICATOR CHLORAPREP ORN 26ML

## (undated) DEVICE — BLADE SURG CARBON STEEL #10

## (undated) DEVICE — ALCOHOL 70% ISOP W/GREEN 16OZ

## (undated) DEVICE — BLADE SIGMA STBCT .5IN SYS 6

## (undated) DEVICE — BOWL MIXING BONE CEMENT

## (undated) DEVICE — SEE MEDLINE ITEM 152523

## (undated) DEVICE — HOOD T-5 TEAR AWAY STERILE

## (undated) DEVICE — PAD PREP 50/CA

## (undated) DEVICE — GAUZE SPONGE 4X4 12PLY

## (undated) DEVICE — SEE MEDLINE ITEM 152530

## (undated) DEVICE — MANIFOLD 4 PORT

## (undated) DEVICE — SUT STRATAFIX PGAPCL 3 FS-1

## (undated) DEVICE — Device